# Patient Record
Sex: MALE | Race: WHITE | NOT HISPANIC OR LATINO | URBAN - METROPOLITAN AREA
[De-identification: names, ages, dates, MRNs, and addresses within clinical notes are randomized per-mention and may not be internally consistent; named-entity substitution may affect disease eponyms.]

---

## 2019-06-13 ENCOUNTER — EMERGENCY (EMERGENCY)
Facility: HOSPITAL | Age: 54
LOS: 1 days | Discharge: HOME | End: 2019-06-13
Attending: EMERGENCY MEDICINE
Payer: COMMERCIAL

## 2019-06-13 VITALS
OXYGEN SATURATION: 98 % | WEIGHT: 179.9 LBS | TEMPERATURE: 98 F | RESPIRATION RATE: 20 BRPM | DIASTOLIC BLOOD PRESSURE: 97 MMHG | HEIGHT: 70 IN | SYSTOLIC BLOOD PRESSURE: 138 MMHG | HEART RATE: 85 BPM

## 2019-06-13 PROCEDURE — 99284 EMERGENCY DEPT VISIT MOD MDM: CPT | Mod: 25

## 2019-06-13 PROCEDURE — 99284 EMERGENCY DEPT VISIT MOD MDM: CPT

## 2019-06-13 PROCEDURE — 93971 EXTREMITY STUDY: CPT

## 2019-06-13 PROCEDURE — 93971 EXTREMITY STUDY: CPT | Mod: 26,RT

## 2019-06-13 NOTE — ED STATDOCS - CARE PLAN
Principal Discharge DX:	Calf pain  Secondary Diagnosis:	Baker cyst, right Principal Discharge DX:	Calf pain  Secondary Diagnosis:	Baker cyst, right  Secondary Diagnosis:	Muscle strain

## 2019-06-13 NOTE — ED STATDOCS - NS ED ROS FT
Const: Denies fever, chills  HEENT: Denies blurry vision, sore throat. +decreased left ear hearing  Neck: Denies neck pain/stiffness  Resp: Denies SOB. +cough  Cardiovascular: Denies CP, palpitations, LE edema  GI: Denies nausea, vomiting, abdominal pain, diarrhea, constipation, blood in stool  : Denies urinary frequency/urgency/dysuria, hematuria  MSK: Denies back pain. +right calf pain   Neuro: Denies HA, dizziness, numbness, weakness  Skin: Denies rashes. Const: Denies fever, chills  HEENT: Denies blurry vision, sore throat. +decreased left ear hearing  Neck: Denies neck pain/stiffness  Resp: Denies SOB. +cough  Cardiovascular: Denies CP, palpitations, LE edema  GI: Denies nausea, vomiting, abdominal pain, diarrhea, constipation, blood in stool  : Denies urinary frequency/urgency/dysuria, hematuria  MSK: Denies back pain. +right calf pain , swellin  Neuro: Denies HA, dizziness, numbness, weakness  Skin: Denies rashes. Const: Denies fever, chills  HEENT: +decreased left ear hearing, + left dental pain. Denies blurry vision, sore throat.   Neck: Denies neck pain/stiffness  Resp: Denies SOB. +cough  Cardiovascular: Denies CP, palpitations  GI: Denies nausea, vomiting, abdominal pain, diarrhea, constipation, blood in stool  : Denies urinary frequency/urgency/dysuria, hematuria  MSK:  +right calf pain , swelling. Denies back pain.  Neuro: Denies HA, dizziness, numbness, weakness  Skin: Denies rashes.

## 2019-06-13 NOTE — ED STATDOCS - ATTENDING CONTRIBUTION TO CARE
I, Erin Valdez, performed the initial face to face bedside interview with this patient regarding history of present illness, review of symptoms and relevant past medical, social and family history.  I completed an independent physical examination.  I was the initial provider who evaluated this patient. I have signed out the follow up of any pending tests (i.e. labs, radiological studies) to the ACP.  I have communicated the patient’s plan of care and disposition with the ACP.

## 2019-06-13 NOTE — ED STATDOCS - OBJECTIVE STATEMENT
55 y/o male with no relevant PMHx presents to the ED c/o right calf pain x1 week. 1 week ago pt was moving a trampoline by dragging it behind him and felt like he "got shot in his right leg". Now pt has pain in his right calf with certain movements with RLE swelling. Pt has had seasonal allergies recently and yesterday had left jaw pain that resolved today. Today pt woke up with decreased hearing in left ear so went to urgent care and they sent pt to the ED regarding his right calf pain. Today pt had onset of cough. No fever, chills, chest pain, sore throat, difficulty breathing, numbness, blurry vision. Pt drives 1.5 hours daily for work. No anti-coagulant use. No recent travel. No recent surgery. Allergic to contrast. 53 y/o male with a PMHx of kidney stones presents to the ED c/o right calf pain x1 week. 1 week ago pt was moving a trampoline by dragging it behind him and felt like he "got shot in his right leg". Now pt has pain in his right calf with certain movements with RLE swelling. Pt has had seasonal allergies recently and yesterday had left jaw pain that resolved today. Today pt woke up with decreased hearing in left ear so went to urgent care and they sent pt to the ED regarding his right calf pain. Today pt had onset of cough. No fever, chills, chest pain, sore throat, difficulty breathing, numbness, blurry vision. Pt drives 1.5 hours daily for work. No anti-coagulant use. Takes Claritin daily. Last took Ibuprofen at 13:00 today. No recent travel. No recent surgery. Allergic to contrast. Feeding Assistance/Meals and Snack/Medical Food Supplements/Vitamin

## 2019-06-13 NOTE — ED STATDOCS - PROGRESS NOTE DETAILS
PA note: PT evaluated by intake physician. HPI/PE/ROS as noted above. Will follow up plan per intake physician. Sono negative for DVT. Results d/w patient. pt provided with copy of sono report and educated on RICE measures for comfort/pain relief. Pt also provided with f/u for ortho for further eval of bakers cyst. Pt encouraged to use ibuprofen/motrin as needed for pain.

## 2019-06-13 NOTE — ED STATDOCS - CLINICAL SUMMARY MEDICAL DECISION MAKING FREE TEXT BOX
Pt presents c/o 1 week of right lower leg pain after he felt a pop while pulling a heavy trampoline. Gradual onset of swelling. taking Ibuprofen for pain at home. Neurovascularly intact. TTP of the medial head of the gastroc. Likely muscle strain vs. DVT. Will sono to evaluate for DVT. Pt declines any pain medications at this time. Pt also with decreased hearing in left ear, no TM infection, cranial nerves intact. Pt declines workup for that at this time.

## 2019-06-13 NOTE — ED STATDOCS - PHYSICAL EXAMINATION
Const: Awake, alert and oriented. In no acute distress. Well appearing.  HEENT: NC/AT. Moist mucous membranes.  Eyes: No scleral icterus. EOMI.  Neck:. Soft and supple. Full ROM without pain.  Cardiac: Regular rate and regular rhythm. +S1/S2. No murmurs. Peripheral pulses 2+ and symmetric. No LE edema.  Resp: Speaking in full sentences. No evidence of respiratory distress. No wheezes, rales or rhonchi.  Abd: Soft, non-tender, non-distended. Normal bowel sounds in all 4 quadrants. No guarding or rebound.  Back: Spine midline and non-tender. No CVAT.  Skin: No rashes, abrasions or lacerations. +ecchymosis from the insertion of the right achilles up the medial aspect of the gastroc.   Lymph: No cervical lymphadenopathy.  Neuro: Awake, alert & oriented x 3. Moves all extremities symmetrically.   MSK: +TTP on the right calf over the medial head of the gastroc. No TTP of popliteal fossa. +edema from the right ankle up to above the knee. DP pulse, popliteal pulse bilaterally. Const: Awake, alert and oriented. In no acute distress. Well appearing.  HEENT: NC/AT. Moist mucous membranes. Bilateral TM's clear without erythema, bulging or purulence. Uvula midline, no posterior erythema, no tonsillar exudate or edema. No gingival erythema or abscess.  Eyes: No scleral icterus. EOMI.  Neck:. Soft and supple. Full ROM without pain.  Cardiac: Regular rate and regular rhythm. +S1/S2. No murmurs. Peripheral pulses 2+ and symmetric. Right LE edema.  Resp: Speaking in full sentences. No evidence of respiratory distress. No wheezes, rales or rhonchi.  Abd: Soft, non-tender, non-distended. Normal bowel sounds in all 4 quadrants. No guarding or rebound.  Back: Spine midline and non-tender. No CVAT.  Skin: No rashes, abrasions or lacerations. +ecchymosis from the insertion of the right achilles up the medial aspect of the gastroc.   Lymph: No cervical lymphadenopathy.  Neuro: Awake, alert & oriented x 3. CN II-XII symmetrically intact. Symmetrical smile, tongue midline, sensation symmetrically intact bilateral face. Moves all extremities symmetrically.   MSK: +TTP on the right calf over the medial head of the gastroc. No TTP of popliteal fossa. 2+ edema from the right ankle up to above the knee. DP pulse, popliteal pulse bilaterally.

## 2019-06-13 NOTE — ED STATDOCS - PMH
No pertinent past medical history <<----- Click to add NO pertinent Past Medical History Kidney stones

## 2020-02-17 ENCOUNTER — EMERGENCY (EMERGENCY)
Facility: HOSPITAL | Age: 55
LOS: 1 days | Discharge: LEFT BEFORE DISCHARGE | End: 2020-02-17
Attending: EMERGENCY MEDICINE | Admitting: EMERGENCY MEDICINE
Payer: COMMERCIAL

## 2020-02-17 VITALS
TEMPERATURE: 98 F | HEIGHT: 70 IN | RESPIRATION RATE: 18 BRPM | OXYGEN SATURATION: 99 % | DIASTOLIC BLOOD PRESSURE: 86 MMHG | HEART RATE: 97 BPM | SYSTOLIC BLOOD PRESSURE: 159 MMHG | WEIGHT: 175.05 LBS

## 2020-02-17 PROCEDURE — 99284 EMERGENCY DEPT VISIT MOD MDM: CPT

## 2020-02-17 PROCEDURE — 72040 X-RAY EXAM NECK SPINE 2-3 VW: CPT | Mod: 26

## 2020-02-17 PROCEDURE — 72100 X-RAY EXAM L-S SPINE 2/3 VWS: CPT | Mod: 26

## 2020-02-17 PROCEDURE — 71101 X-RAY EXAM UNILAT RIBS/CHEST: CPT | Mod: 26

## 2020-02-17 RX ORDER — IBUPROFEN 200 MG
600 TABLET ORAL ONCE
Refills: 0 | Status: COMPLETED | OUTPATIENT
Start: 2020-02-17 | End: 2020-02-17

## 2020-02-17 RX ADMIN — Medication 600 MILLIGRAM(S): at 22:05
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
## 2020-02-17 NOTE — ED STATDOCS - NSFOLLOWUPINSTRUCTIONS_ED_ALL_ED_FT
- Prescription sent to pharmacy.  - Please call to schedule follow up appointment with your primary care physician within 24-48 hours.  - Please seek immediate medical attention for any new/worsening, signs/symptoms, or concerns.    Feel better! - The patient is leaving AMA.  - Prescription sent to pharmacy.  - Please call to schedule follow up appointment with your primary care physician within 24-48 hours.  - Please seek immediate medical attention for any new/worsening, signs/symptoms, or concerns.    Feel better!

## 2020-02-17 NOTE — ED STATDOCS - PROGRESS NOTE DETAILS
NICK uDran: Patient evaluated by intake physician. HPI/ROS/PE as noted above. Will follow up plan per intake physician and continue to assess patient.  PCP: NICK Duran: Pt does not wish to wait, choosing to leave AMA. I had a lengthy discussion with the patient. The patient wishes to leave at this time. The patient verbalizes understanding that he/she is leaving against medical advice despite the risk of missing a potential serious diagnosis which may lead to injury, disability, and/or death. I discussed with the patient which tests may need to be performed. I was unable to convince the patient to stay for further work-up. The patient is alert and oriented and demonstrates competence in making medical decisions.

## 2020-02-17 NOTE — ED STATDOCS - ATTENDING CONTRIBUTION TO CARE
I, Monica Lara, performed the initial face to face bedside interview with this patient regarding history of present illness, review of symptoms and relevant past medical, social and family history.  I completed an independent physical examination.  I was the initial provider who evaluated this patient. I have signed out the follow up of any pending tests (i.e. labs, radiological studies) to the ACP.  I have communicated the patient’s plan of care and disposition with the ACP.  The history, relevant review of systems, past medical and surgical history, medical decision making, and physical examination was documented by the scribe in my presence and I attest to the accuracy of the documentation.
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
## 2020-02-17 NOTE — ED STATDOCS - PATIENT PORTAL LINK FT
You can access the FollowMyHealth Patient Portal offered by [facility] by registering at the following website: [email or web address]. By joining [employer]’s FollowMyHealth portal, you will also be able to view your health information using other applications (apps) compatible with our system.

## 2020-02-17 NOTE — ED ADULT TRIAGE NOTE - CHIEF COMPLAINT QUOTE
a dog attacked me I fell back I have pain lower back lt leg and lt leg pain and pins and needles  also lt side  rib area

## 2020-02-17 NOTE — ED STATDOCS - CARE PROVIDER_API CALL
Balwinder Maldonado (DO)  Orthopaedic Surgery  [address]  [place] [address]  Phone: [phone]  Fax: [phone]  Follow Up Time:

## 2020-02-17 NOTE — ED ADULT NURSE NOTE - EXPLANATION OF PATIENT'S REASON FOR LEAVING
wait to long will see MD in AM and ask for CT patient aware to return if symptoms become worse patient agreeable

## 2020-02-18 PROBLEM — N20.0 CALCULUS OF KIDNEY: Chronic | Status: ACTIVE | Noted: 2019-06-13

## 2020-02-18 PROCEDURE — 71101 X-RAY EXAM UNILAT RIBS/CHEST: CPT

## 2020-02-18 PROCEDURE — 99284 EMERGENCY DEPT VISIT MOD MDM: CPT

## 2020-02-18 PROCEDURE — 72100 X-RAY EXAM L-S SPINE 2/3 VWS: CPT

## 2020-02-18 PROCEDURE — 72040 X-RAY EXAM NECK SPINE 2-3 VW: CPT

## 2020-02-18 RX ORDER — LIDOCAINE 4 G/100G
1 CREAM TOPICAL ONCE
Refills: 0 | Status: COMPLETED | OUTPATIENT
Start: 2020-02-18 | End: 2020-02-18

## 2020-02-18 RX ORDER — CYCLOBENZAPRINE HYDROCHLORIDE 10 MG/1
1 TABLET, FILM COATED ORAL
Qty: 15 | Refills: 0
Start: 2020-02-18 | End: 2020-02-22

## 2020-02-18 RX ORDER — IBUPROFEN 200 MG
1 TABLET ORAL
Qty: 28 | Refills: 0
Start: 2020-02-18 | End: 2020-02-24

## 2020-02-18 RX ADMIN — LIDOCAINE 1 PATCH: 4 CREAM TOPICAL at 00:56

## 2021-03-12 ENCOUNTER — TRANSCRIPTION ENCOUNTER (OUTPATIENT)
Age: 56
End: 2021-03-12

## 2021-03-27 ENCOUNTER — EMERGENCY (EMERGENCY)
Facility: HOSPITAL | Age: 56
LOS: 1 days | Discharge: HOME | End: 2021-03-27
Attending: EMERGENCY MEDICINE
Payer: COMMERCIAL

## 2021-03-27 VITALS
RESPIRATION RATE: 20 BRPM | SYSTOLIC BLOOD PRESSURE: 137 MMHG | HEART RATE: 92 BPM | WEIGHT: 186.07 LBS | TEMPERATURE: 99 F | OXYGEN SATURATION: 99 % | HEIGHT: 70 IN | DIASTOLIC BLOOD PRESSURE: 87 MMHG

## 2021-03-27 LAB
ALBUMIN SERPL ELPH-MCNC: 3.8 G/DL — SIGNIFICANT CHANGE UP (ref 3.3–5.2)
ALP SERPL-CCNC: 72 U/L — SIGNIFICANT CHANGE UP (ref 40–120)
ALT FLD-CCNC: 22 U/L — SIGNIFICANT CHANGE UP
ANION GAP SERPL CALC-SCNC: 13 MMOL/L — SIGNIFICANT CHANGE UP (ref 5–17)
APTT BLD: 24.3 SEC — LOW (ref 27.5–35.5)
AST SERPL-CCNC: 15 U/L — SIGNIFICANT CHANGE UP
BASOPHILS # BLD AUTO: 0.03 K/UL — SIGNIFICANT CHANGE UP (ref 0–0.2)
BASOPHILS NFR BLD AUTO: 0.3 % — SIGNIFICANT CHANGE UP (ref 0–2)
BILIRUB SERPL-MCNC: 0.6 MG/DL — SIGNIFICANT CHANGE UP (ref 0.4–2)
BUN SERPL-MCNC: 18 MG/DL — SIGNIFICANT CHANGE UP (ref 8–20)
CALCIUM SERPL-MCNC: 9.3 MG/DL — SIGNIFICANT CHANGE UP (ref 8.6–10.2)
CHLORIDE SERPL-SCNC: 100 MMOL/L — SIGNIFICANT CHANGE UP (ref 98–107)
CO2 SERPL-SCNC: 23 MMOL/L — SIGNIFICANT CHANGE UP (ref 22–29)
CREAT SERPL-MCNC: 0.83 MG/DL — SIGNIFICANT CHANGE UP (ref 0.5–1.3)
CRP SERPL-MCNC: <4 MG/L — SIGNIFICANT CHANGE UP
D DIMER BLD IA.RAPID-MCNC: <150 NG/ML DDU — SIGNIFICANT CHANGE UP
EOSINOPHIL # BLD AUTO: 0.03 K/UL — SIGNIFICANT CHANGE UP (ref 0–0.5)
EOSINOPHIL NFR BLD AUTO: 0.3 % — SIGNIFICANT CHANGE UP (ref 0–6)
FERRITIN SERPL-MCNC: 113 NG/ML — SIGNIFICANT CHANGE UP (ref 30–400)
GLUCOSE SERPL-MCNC: 94 MG/DL — SIGNIFICANT CHANGE UP (ref 70–99)
HCT VFR BLD CALC: 46.3 % — SIGNIFICANT CHANGE UP (ref 39–50)
HGB BLD-MCNC: 15.1 G/DL — SIGNIFICANT CHANGE UP (ref 13–17)
IMM GRANULOCYTES NFR BLD AUTO: 0.9 % — SIGNIFICANT CHANGE UP (ref 0–1.5)
INR BLD: 1.05 RATIO — SIGNIFICANT CHANGE UP (ref 0.88–1.16)
LYMPHOCYTES # BLD AUTO: 18.8 % — SIGNIFICANT CHANGE UP (ref 13–44)
LYMPHOCYTES # BLD AUTO: 2.1 K/UL — SIGNIFICANT CHANGE UP (ref 1–3.3)
MCHC RBC-ENTMCNC: 27.4 PG — SIGNIFICANT CHANGE UP (ref 27–34)
MCHC RBC-ENTMCNC: 32.6 GM/DL — SIGNIFICANT CHANGE UP (ref 32–36)
MCV RBC AUTO: 83.9 FL — SIGNIFICANT CHANGE UP (ref 80–100)
MONOCYTES # BLD AUTO: 0.67 K/UL — SIGNIFICANT CHANGE UP (ref 0–0.9)
MONOCYTES NFR BLD AUTO: 6 % — SIGNIFICANT CHANGE UP (ref 2–14)
NEUTROPHILS # BLD AUTO: 8.22 K/UL — HIGH (ref 1.8–7.4)
NEUTROPHILS NFR BLD AUTO: 73.7 % — SIGNIFICANT CHANGE UP (ref 43–77)
PLATELET # BLD AUTO: 247 K/UL — SIGNIFICANT CHANGE UP (ref 150–400)
POTASSIUM SERPL-MCNC: 4.2 MMOL/L — SIGNIFICANT CHANGE UP (ref 3.5–5.3)
POTASSIUM SERPL-SCNC: 4.2 MMOL/L — SIGNIFICANT CHANGE UP (ref 3.5–5.3)
PROCALCITONIN SERPL-MCNC: 0.04 NG/ML — SIGNIFICANT CHANGE UP (ref 0.02–0.1)
PROT SERPL-MCNC: 6.8 G/DL — SIGNIFICANT CHANGE UP (ref 6.6–8.7)
PROTHROM AB SERPL-ACNC: 12.1 SEC — SIGNIFICANT CHANGE UP (ref 10.6–13.6)
RBC # BLD: 5.52 M/UL — SIGNIFICANT CHANGE UP (ref 4.2–5.8)
RBC # FLD: 16.1 % — HIGH (ref 10.3–14.5)
SODIUM SERPL-SCNC: 136 MMOL/L — SIGNIFICANT CHANGE UP (ref 135–145)
WBC # BLD: 11.15 K/UL — HIGH (ref 3.8–10.5)
WBC # FLD AUTO: 11.15 K/UL — HIGH (ref 3.8–10.5)

## 2021-03-27 PROCEDURE — 86140 C-REACTIVE PROTEIN: CPT

## 2021-03-27 PROCEDURE — 93005 ELECTROCARDIOGRAM TRACING: CPT

## 2021-03-27 PROCEDURE — 94640 AIRWAY INHALATION TREATMENT: CPT

## 2021-03-27 PROCEDURE — 84145 PROCALCITONIN (PCT): CPT

## 2021-03-27 PROCEDURE — 85730 THROMBOPLASTIN TIME PARTIAL: CPT

## 2021-03-27 PROCEDURE — U0003: CPT

## 2021-03-27 PROCEDURE — 80053 COMPREHEN METABOLIC PANEL: CPT

## 2021-03-27 PROCEDURE — U0005: CPT

## 2021-03-27 PROCEDURE — 96365 THER/PROPH/DIAG IV INF INIT: CPT

## 2021-03-27 PROCEDURE — 96375 TX/PRO/DX INJ NEW DRUG ADDON: CPT

## 2021-03-27 PROCEDURE — 85610 PROTHROMBIN TIME: CPT

## 2021-03-27 PROCEDURE — 99284 EMERGENCY DEPT VISIT MOD MDM: CPT | Mod: 25

## 2021-03-27 PROCEDURE — 82728 ASSAY OF FERRITIN: CPT

## 2021-03-27 PROCEDURE — 85025 COMPLETE CBC W/AUTO DIFF WBC: CPT

## 2021-03-27 PROCEDURE — 71046 X-RAY EXAM CHEST 2 VIEWS: CPT

## 2021-03-27 PROCEDURE — 36415 COLL VENOUS BLD VENIPUNCTURE: CPT

## 2021-03-27 PROCEDURE — 71046 X-RAY EXAM CHEST 2 VIEWS: CPT | Mod: 26

## 2021-03-27 PROCEDURE — 87631 RESP VIRUS 3-5 TARGETS: CPT

## 2021-03-27 PROCEDURE — 99285 EMERGENCY DEPT VISIT HI MDM: CPT

## 2021-03-27 PROCEDURE — 93010 ELECTROCARDIOGRAM REPORT: CPT

## 2021-03-27 PROCEDURE — 85379 FIBRIN DEGRADATION QUANT: CPT

## 2021-03-27 RX ORDER — IPRATROPIUM/ALBUTEROL SULFATE 18-103MCG
3 AEROSOL WITH ADAPTER (GRAM) INHALATION ONCE
Refills: 0 | Status: COMPLETED | OUTPATIENT
Start: 2021-03-27 | End: 2021-03-27

## 2021-03-27 RX ORDER — DIPHENHYDRAMINE HCL 50 MG
50 CAPSULE ORAL ONCE
Refills: 0 | Status: COMPLETED | OUTPATIENT
Start: 2021-03-27 | End: 2021-03-27

## 2021-03-27 RX ORDER — MAGNESIUM SULFATE 500 MG/ML
2 VIAL (ML) INJECTION ONCE
Refills: 0 | Status: COMPLETED | OUTPATIENT
Start: 2021-03-27 | End: 2021-03-27

## 2021-03-27 RX ADMIN — Medication 50 GRAM(S): at 14:38

## 2021-03-27 RX ADMIN — Medication 3 MILLILITER(S): at 15:31

## 2021-03-27 RX ADMIN — Medication 2 GRAM(S): at 15:38

## 2021-03-27 RX ADMIN — Medication 125 MILLIGRAM(S): at 14:38

## 2021-03-27 RX ADMIN — Medication 50 MILLIGRAM(S): at 14:38

## 2021-03-27 NOTE — ED STATDOCS - ATTENDING CONTRIBUTION TO CARE
I, Joe Gonzalez, performed the initial face to face bedside interview with this patient regarding history of present illness, review of symptoms and relevant past medical, social and family history.  I completed an independent physical examination.  I was the initial provider who evaluated this patient. I have signed out the follow up of any pending tests (i.e. labs, radiological studies) to the ACP.  I have communicated the patient’s plan of care and disposition with the ACP.

## 2021-03-27 NOTE — ED ADULT TRIAGE NOTE - CHIEF COMPLAINT QUOTE
Pt arrives to ED c/o cough , pt states " I think I have allergic reaction to my second COVID vaccine given on  MArch 10 th " pt states since then  he has been coughing , was seen in [facility] ED  on March 19th and was placed on steroids, March 24th was seen in Urgent Care and placed again on steroids and antibiotics without improvement , also had negative COVID test on March 19th. Denies chest pain

## 2021-03-27 NOTE — ED STATDOCS - CARE PROVIDER_API CALL
Phil Moreira)  Internal Medicine; Pulmonary Disease  [facility] at [facility], [address] [place] [address], [address]  [place] [address]  Phone: [phone]  Fax: [phone]  Follow Up Time:

## 2021-03-27 NOTE — ED STATDOCS - OBJECTIVE STATEMENT
55 y/o M pt with significant PMHx of kidney stone and asthma presents to the ED c/o cough for 2 weeks with associated SOB. Pt was told it may be a reaction to the 2nd dose of the COVID vaccine which he received 03/10. Pt states that on 03/12 he went to , where he received Symbicort and a nebulizer. Pt states on 03/19 he was seen at [facility] ED where he obtained a CXR, a COVID negative test, and more steroids. Pt states his coughing has not subsided with the medications, Pt states that 3 days ago, he was seen by his PMD where he was given more doses of steroids and ABx.     Denies sore throat

## 2021-03-27 NOTE — ED STATDOCS - PMH
We give one refill. If continues to have symptoms after refill need to be seen for further eval.  Kidney stones

## 2021-03-27 NOTE — ED ADULT TRIAGE NOTE - IDEAL BODY WEIGHT(KG)
73 Cephalexin Counseling: I counseled the patient regarding use of cephalexin as an antibiotic for prophylactic and/or therapeutic purposes. Cephalexin (commonly prescribed under brand name Keflex) is a cephalosporin antibiotic which is active against numerous classes of bacteria, including most skin bacteria. Side effects may include nausea, diarrhea, gastrointestinal upset, rash, hives, yeast infections, and in rare cases, hepatitis, kidney disease, seizures, fever, confusion, neurologic symptoms, and others. Patients with severe allergies to penicillin medications are cautioned that there is about a 10% incidence of cross-reactivity with cephalosporins. When possible, patients with penicillin allergies should use alternatives to cephalosporins for antibiotic therapy.

## 2021-03-27 NOTE — ED STATDOCS - PATIENT PORTAL LINK FT
You can access the FollowMyHealth Patient Portal offered by [facility] by registering at the following website: [email or web address]. By joining [employer]’s FollowMyHealth portal, you will also be able to view your health information using other applications (apps) compatible with our system.

## 2021-03-27 NOTE — ED STATDOCS - PROGRESS NOTE DETAILS
Pt reports significant relief of presenting symptoms. Pt states that he already has cough medicine at home and is in the middle of a abx/steroid course, and does not need any medication for home. Pts labs otherwise wnl, flu negative. Pt stable for d/c with pulmonology f/u as need.

## 2021-04-07 ENCOUNTER — APPOINTMENT (OUTPATIENT)
Dept: PULMONOLOGY | Facility: CLINIC | Age: 56
End: 2021-04-07
Payer: COMMERCIAL

## 2021-04-07 VITALS
DIASTOLIC BLOOD PRESSURE: 92 MMHG | BODY MASS INDEX: 29.19 KG/M2 | HEIGHT: 67 IN | HEART RATE: 77 BPM | WEIGHT: 186 LBS | SYSTOLIC BLOOD PRESSURE: 140 MMHG | OXYGEN SATURATION: 98 %

## 2021-04-07 VITALS — RESPIRATION RATE: 16 BRPM

## 2021-04-07 DIAGNOSIS — Z82.5 FAMILY HISTORY OF ASTHMA AND OTHER CHRONIC LOWER RESPIRATORY DISEASES: ICD-10-CM

## 2021-04-07 DIAGNOSIS — Z23 ENCOUNTER FOR IMMUNIZATION: ICD-10-CM

## 2021-04-07 PROCEDURE — 99072 ADDL SUPL MATRL&STAF TM PHE: CPT

## 2021-04-07 PROCEDURE — 99204 OFFICE O/P NEW MOD 45 MIN: CPT

## 2021-04-07 RX ORDER — ALBUTEROL SULFATE 2.5 MG/3ML
(2.5 MG/3ML) SOLUTION RESPIRATORY (INHALATION)
Refills: 0 | Status: ACTIVE | COMMUNITY

## 2021-04-07 RX ORDER — ALBUTEROL SULFATE 90 UG/1
108 AEROSOL, METERED RESPIRATORY (INHALATION)
Refills: 0 | Status: ACTIVE | COMMUNITY

## 2021-04-07 NOTE — CONSULT LETTER
[Dear  ___] : Dear  [unfilled], [Consult Letter:] : I had the pleasure of evaluating your patient, [unfilled]. [Please see my note below.] : Please see my note below. [Consult Closing:] : Thank you very much for allowing me to participate in the care of this patient.  If you have any questions, please do not hesitate to contact me. [Sincerely,] : Sincerely, [FreeTextEntry3] : Yolsi Au MD FCCP\par D-ABSM\par ABIM board certified in  Pulmonary diseases, Sleep medicine\par Internal medicine\par

## 2021-04-07 NOTE — PHYSICAL EXAM
[No Acute Distress] : no acute distress [Normal Oropharynx] : normal oropharynx [II] : Mallampati Class: II [Normal Appearance] : normal appearance [No Neck Mass] : no neck mass [Normal Rate/Rhythm] : normal rate/rhythm [Normal S1, S2] : normal s1, s2 [No Murmurs] : no murmurs [No Resp Distress] : no resp distress [No Abnormalities] : no abnormalities [Clear to Auscultation Bilaterally] : clear to auscultation bilaterally [Benign] : benign [Normal Gait] : normal gait [No Clubbing] : no clubbing [No Cyanosis] : no cyanosis [No Edema] : no edema [FROM] : FROM [Normal Color/ Pigmentation] : normal color/ pigmentation [No Focal Deficits] : no focal deficits [Oriented x3] : oriented x3 [Normal Affect] : normal affect

## 2021-04-07 NOTE — ASSESSMENT
[FreeTextEntry1] : It is unclear what is happening here. The patient may have had some reaction to the vaccine leading to some bronchospasm. It seems to have resolved with steroids and bronchodilators. It is possible that his asthma is worse than he thinks. Pulmonary function studies have been ordered to look for that.\par \par The oral pain sounds neuropathic to me. I have seen neuropathic dental pain associated with covid, and it may be possible that the immune reaction to the vaccine could cause the same thing. We will try gabapentin 100 mg t.i.d. to see if this helps.\par \par I will see the patient back in about 3 weeks to review his response to gabapentin and his pulmonary function studies.

## 2021-04-07 NOTE — HISTORY OF PRESENT ILLNESS
[TextBox_4] : The patient is a 56-year-old male who has a history of mild intermittent asthma with rare rescue inhaler use. Approximately 4 weeks ago he had his second vaccination for covid 19. 2 days later he developed shortness of breath along with cough. He was seen in urgent care and given a nebulizer which helped slightly. 2 weeks ago he developed ongoing shortness of breath and cough with wheeze. He was seen in the [facility] emergency room and given nebulizers and IV steroids which helped. He was sent home on a tapering course of prednisone along with Symbicort and albuterol. He started to feel better with that and currently is using no medications. Concomitant with that he's developed left jaw pain and tooth pain. He was seen by his dentist who didn't find anything and then by an oral surgeon who did not find anything. He is on antibiotics currently for that. The discomfort is still there.

## 2021-05-15 ENCOUNTER — APPOINTMENT (OUTPATIENT)
Dept: DISASTER EMERGENCY | Facility: CLINIC | Age: 56
End: 2021-05-15

## 2021-05-15 ENCOUNTER — LABORATORY RESULT (OUTPATIENT)
Age: 56
End: 2021-05-15

## 2021-05-18 ENCOUNTER — APPOINTMENT (OUTPATIENT)
Dept: PULMONOLOGY | Facility: CLINIC | Age: 56
End: 2021-05-18
Payer: COMMERCIAL

## 2021-05-18 VITALS — HEIGHT: 68 IN | WEIGHT: 184 LBS | BODY MASS INDEX: 27.89 KG/M2 | TEMPERATURE: 98.1 F

## 2021-05-18 VITALS — DIASTOLIC BLOOD PRESSURE: 80 MMHG | SYSTOLIC BLOOD PRESSURE: 130 MMHG | HEART RATE: 74 BPM | OXYGEN SATURATION: 98 %

## 2021-05-18 DIAGNOSIS — G89.29 NEURALGIA AND NEURITIS, UNSPECIFIED: ICD-10-CM

## 2021-05-18 DIAGNOSIS — J45.909 UNSPECIFIED ASTHMA, UNCOMPLICATED: ICD-10-CM

## 2021-05-18 DIAGNOSIS — M79.2 NEURALGIA AND NEURITIS, UNSPECIFIED: ICD-10-CM

## 2021-05-18 PROCEDURE — 85018 HEMOGLOBIN: CPT | Mod: QW

## 2021-05-18 PROCEDURE — 94729 DIFFUSING CAPACITY: CPT

## 2021-05-18 PROCEDURE — 94727 GAS DIL/WSHOT DETER LNG VOL: CPT

## 2021-05-18 PROCEDURE — 99214 OFFICE O/P EST MOD 30 MIN: CPT | Mod: 25

## 2021-05-18 PROCEDURE — 94010 BREATHING CAPACITY TEST: CPT

## 2021-05-18 PROCEDURE — 99072 ADDL SUPL MATRL&STAF TM PHE: CPT

## 2021-05-18 RX ORDER — CEFDINIR 300 MG/1
300 CAPSULE ORAL
Qty: 20 | Refills: 0 | Status: DISCONTINUED | COMMUNITY
Start: 2021-03-24

## 2021-05-18 RX ORDER — HYDROCODONE BITARTRATE AND ACETAMINOPHEN 5; 325 MG/1; MG/1
5-325 TABLET ORAL
Qty: 12 | Refills: 0 | Status: DISCONTINUED | COMMUNITY
Start: 2021-04-08

## 2021-05-18 RX ORDER — IBUPROFEN 600 MG/1
600 TABLET, FILM COATED ORAL
Qty: 12 | Refills: 0 | Status: DISCONTINUED | COMMUNITY
Start: 2021-04-06

## 2021-05-18 RX ORDER — BENZONATATE 200 MG/1
200 CAPSULE ORAL
Qty: 42 | Refills: 0 | Status: DISCONTINUED | COMMUNITY
Start: 2021-03-24

## 2021-05-18 RX ORDER — AMOXICILLIN 875 MG/1
875 TABLET, FILM COATED ORAL
Qty: 14 | Refills: 0 | Status: DISCONTINUED | COMMUNITY
Start: 2021-04-12

## 2021-05-18 RX ORDER — BUDESONIDE AND FORMOTEROL FUMARATE DIHYDRATE 160; 4.5 UG/1; UG/1
160-4.5 AEROSOL RESPIRATORY (INHALATION)
Qty: 10 | Refills: 0 | Status: ACTIVE | COMMUNITY
Start: 2021-03-12

## 2021-05-18 RX ORDER — METHYLPREDNISOLONE 4 MG/1
4 TABLET ORAL
Qty: 21 | Refills: 0 | Status: DISCONTINUED | COMMUNITY
Start: 2021-03-24

## 2021-05-18 NOTE — HISTORY OF PRESENT ILLNESS
[TextBox_4] : Patient came in for pulmonary function studies today. Gabapentin did help his dental pain but he was ultimately found to require root canal. That solved the problem permanently. He denies any shortness of breath. He has had rare use of albuterol inhaler.

## 2021-05-18 NOTE — ASSESSMENT
[FreeTextEntry1] : Patient has mild intermittent asthma doing well. He is status post covid infection without any residual problems. Dental pain has resolved. Follow up here p.r.n.

## 2022-06-28 NOTE — ED ADULT NURSE NOTE - NS ED NURSE DISCH DISPOSITION
spontaneous/unlabored AMA (saw a physician/midlevel provider and clinician was able to provide reasons for staying for treatment & form is signed)

## 2024-01-04 ENCOUNTER — OUTPATIENT (OUTPATIENT)
Dept: OUTPATIENT SERVICES | Facility: HOSPITAL | Age: 59
LOS: 1 days | End: 2024-01-04

## 2024-01-04 VITALS
HEIGHT: 69 IN | DIASTOLIC BLOOD PRESSURE: 85 MMHG | OXYGEN SATURATION: 98 % | RESPIRATION RATE: 18 BRPM | SYSTOLIC BLOOD PRESSURE: 131 MMHG | HEART RATE: 95 BPM | WEIGHT: 175.05 LBS | TEMPERATURE: 98 F

## 2024-01-04 DIAGNOSIS — J45.909 UNSPECIFIED ASTHMA, UNCOMPLICATED: ICD-10-CM

## 2024-01-04 DIAGNOSIS — N20.0 CALCULUS OF KIDNEY: ICD-10-CM

## 2024-01-04 DIAGNOSIS — Z98.890 OTHER SPECIFIED POSTPROCEDURAL STATES: Chronic | ICD-10-CM

## 2024-01-04 LAB
BLD GP AB SCN SERPL QL: NEGATIVE — SIGNIFICANT CHANGE UP
BLD GP AB SCN SERPL QL: NEGATIVE — SIGNIFICANT CHANGE UP
RH IG SCN BLD-IMP: POSITIVE — SIGNIFICANT CHANGE UP
RH IG SCN BLD-IMP: POSITIVE — SIGNIFICANT CHANGE UP

## 2024-01-04 RX ORDER — SODIUM CHLORIDE 9 MG/ML
1000 INJECTION, SOLUTION INTRAVENOUS
Refills: 0 | Status: DISCONTINUED | OUTPATIENT
Start: 2024-01-09 | End: 2024-01-23

## 2024-01-04 NOTE — H&P PST ADULT - PROBLEM SELECTOR PLAN 1
Patient tentatively scheduled for  Left percutaneous nephrolithotomy on 1/9/24.   Pre-op instructions provided. Pt given verbal and written instructions with teach back on chlorhexidine shampoo and pepcid. Pt verbalized understanding with return demonstration.     Copy of labs CBC, CMP, Ua, EKG in chart.  Urine culture was done at PST.   Copy of Medical evaluation in chart.

## 2024-01-04 NOTE — H&P PST ADULT - GASTROINTESTINAL
normal/soft/nontender/nondistended/normal active bowel sounds/no guarding/no rigidity/no palpable han details…

## 2024-01-04 NOTE — H&P PST ADULT - PRIMARY CARE PROVIDER
Dr. Guy Balbuena- [phone]        Fax [phone] Dr. Guy Balbuena- [phone]        Fax [phone] Dr. Guy Balbuena- [phone]             Fax [phone] Dr. Guy Balbuena- [phone]             Fax [phone]

## 2024-01-04 NOTE — H&P PST ADULT - MUSCULOSKELETAL
normal/ROM intact/no joint swelling/no joint erythema/no joint warmth/no calf tenderness/normal gait/strength 5/5 bilateral upper extremities/strength 5/5 bilateral lower extremities details… normal/ROM intact/no joint swelling/no joint erythema/no joint warmth/no calf tenderness/normal gait/strength 5/5 bilateral upper extremities/strength 5/5 bilateral lower extremities/back exam

## 2024-01-04 NOTE — H&P PST ADULT - NEGATIVE GENERAL GENITOURINARY SYMPTOMS
no hematuria/no flank pain R/no urine discoloration/no incontinence/no dysuria/no urinary hesitancy/normal urinary frequency

## 2024-01-04 NOTE — H&P PST ADULT - HISTORY OF PRESENT ILLNESS
58 year old male with pmhx of Asthma, Kidney stones s/p Lithotripsy (15 years ago and 8 years ago), presents for pre-op evaluation for diagnosis of Calculus of kidney. Patient is scheduled for Left percutaneous nephrolithotomy. Patient c/o b/l flank pain. Denies gross hematuria, dysuria.

## 2024-01-04 NOTE — H&P PST ADULT - NSANTHOSAYNRD_GEN_A_CORE
No. PETRA screening performed.  STOP BANG Legend: 0-2 = LOW Risk; 3-4 = INTERMEDIATE Risk; 5-8 = HIGH Risk

## 2024-01-04 NOTE — H&P PST ADULT - NEGATIVE ENMT SYMPTOMS
no hearing difficulty/no ear pain/no tinnitus/no vertigo/no sinus symptoms/no nasal congestion/no nasal discharge/no dry mouth/no throat pain/no dysphagia

## 2024-01-05 LAB
CULTURE RESULTS: SIGNIFICANT CHANGE UP
CULTURE RESULTS: SIGNIFICANT CHANGE UP
RH IG SCN BLD-IMP: POSITIVE — SIGNIFICANT CHANGE UP
RH IG SCN BLD-IMP: POSITIVE — SIGNIFICANT CHANGE UP
SPECIMEN SOURCE: SIGNIFICANT CHANGE UP
SPECIMEN SOURCE: SIGNIFICANT CHANGE UP

## 2024-01-08 ENCOUNTER — TRANSCRIPTION ENCOUNTER (OUTPATIENT)
Age: 59
End: 2024-01-08

## 2024-01-08 NOTE — ASU PATIENT PROFILE, ADULT - FALL HARM RISK - UNIVERSAL INTERVENTIONS
Bed in lowest position, wheels locked, appropriate side rails in place/Call bell, personal items and telephone in reach/Instruct patient to call for assistance before getting out of bed or chair/Non-slip footwear when patient is out of bed/[place] to call system/Physically safe environment - no spills, clutter or unnecessary equipment/Purposeful Proactive Rounding/Room/bathroom lighting operational, light cord in reach Bed in lowest position, wheels locked, appropriate side rails in place/Call bell, personal items and telephone in reach/Instruct patient to call for assistance before getting out of bed or chair/Non-slip footwear when patient is out of bed/[place] to call system/Physically safe environment - no spills, clutter or unnecessary equipment/Purposeful Proactive Rounding/Room/bathroom lighting operational, light cord in reach

## 2024-01-09 ENCOUNTER — TRANSCRIPTION ENCOUNTER (OUTPATIENT)
Age: 59
End: 2024-01-09

## 2024-01-09 ENCOUNTER — OUTPATIENT (OUTPATIENT)
Dept: OUTPATIENT SERVICES | Facility: HOSPITAL | Age: 59
LOS: 1 days | Discharge: HOME | End: 2024-01-09

## 2024-01-09 VITALS
WEIGHT: 175.05 LBS | SYSTOLIC BLOOD PRESSURE: 132 MMHG | HEART RATE: 69 BPM | TEMPERATURE: 98 F | HEIGHT: 69 IN | OXYGEN SATURATION: 98 % | RESPIRATION RATE: 16 BRPM | DIASTOLIC BLOOD PRESSURE: 84 MMHG

## 2024-01-09 VITALS
SYSTOLIC BLOOD PRESSURE: 122 MMHG | HEART RATE: 88 BPM | OXYGEN SATURATION: 100 % | DIASTOLIC BLOOD PRESSURE: 69 MMHG | RESPIRATION RATE: 15 BRPM

## 2024-01-09 DIAGNOSIS — Z98.890 OTHER SPECIFIED POSTPROCEDURAL STATES: Chronic | ICD-10-CM

## 2024-01-09 DIAGNOSIS — N20.0 CALCULUS OF KIDNEY: ICD-10-CM

## 2024-01-09 DEVICE — SURGIFLO MATRIX WITH THROMBIN KIT: Type: IMPLANTABLE DEVICE | Status: FUNCTIONAL

## 2024-01-09 DEVICE — TRILOGY PROBE KIT 3.9MM X 440MM (BLUE): Type: IMPLANTABLE DEVICE | Status: FUNCTIONAL

## 2024-01-09 DEVICE — URETERAL CATH AXXCESS OPEN END 6FR 70CM: Type: IMPLANTABLE DEVICE | Status: FUNCTIONAL

## 2024-01-09 DEVICE — DILATOR SHEATH SET 8/10: Type: IMPLANTABLE DEVICE | Status: FUNCTIONAL

## 2024-01-09 DEVICE — URETERAL STENT PERCUFLEX PLUS 6FR 26CM: Type: IMPLANTABLE DEVICE | Status: FUNCTIONAL

## 2024-01-09 DEVICE — NEPHROSTOMY BALLOON CATH NEPHROMAX 24FR 17CM PTFE: Type: IMPLANTABLE DEVICE | Status: FUNCTIONAL

## 2024-01-09 DEVICE — GUIDEWIRE SENSOR DUAL-FLEX NITINOL STRAIGHT .035" X 150CM: Type: IMPLANTABLE DEVICE | Status: FUNCTIONAL

## 2024-01-09 RX ORDER — METOCLOPRAMIDE HCL 10 MG
10 TABLET ORAL ONCE
Refills: 0 | Status: COMPLETED | OUTPATIENT
Start: 2024-01-09 | End: 2024-01-09

## 2024-01-09 RX ORDER — ACETAMINOPHEN 500 MG
1000 TABLET ORAL ONCE
Refills: 0 | Status: COMPLETED | OUTPATIENT
Start: 2024-01-09 | End: 2024-01-09

## 2024-01-09 RX ORDER — HYDROMORPHONE HYDROCHLORIDE 2 MG/ML
0.5 INJECTION INTRAMUSCULAR; INTRAVENOUS; SUBCUTANEOUS
Refills: 0 | Status: DISCONTINUED | OUTPATIENT
Start: 2024-01-09 | End: 2024-01-09

## 2024-01-09 RX ORDER — HYDROMORPHONE HYDROCHLORIDE 2 MG/ML
1 INJECTION INTRAMUSCULAR; INTRAVENOUS; SUBCUTANEOUS
Refills: 0 | Status: DISCONTINUED | OUTPATIENT
Start: 2024-01-09 | End: 2024-01-09

## 2024-01-09 RX ORDER — ONDANSETRON 8 MG/1
4 TABLET, FILM COATED ORAL ONCE
Refills: 0 | Status: COMPLETED | OUTPATIENT
Start: 2024-01-09 | End: 2024-01-09

## 2024-01-09 RX ORDER — ALBUTEROL 90 UG/1
2 AEROSOL, METERED ORAL
Refills: 0 | DISCHARGE

## 2024-01-09 RX ORDER — OXYCODONE HYDROCHLORIDE 5 MG/1
5 TABLET ORAL ONCE
Refills: 0 | Status: COMPLETED | OUTPATIENT
Start: 2024-01-09 | End: 2024-01-09

## 2024-01-09 RX ADMIN — ONDANSETRON 4 MILLIGRAM(S): 8 TABLET, FILM COATED ORAL at 11:10

## 2024-01-09 RX ADMIN — ONDANSETRON 4 MILLIGRAM(S): 8 TABLET, FILM COATED ORAL at 10:46

## 2024-01-09 RX ADMIN — HYDROMORPHONE HYDROCHLORIDE 0.5 MILLIGRAM(S): 2 INJECTION INTRAMUSCULAR; INTRAVENOUS; SUBCUTANEOUS at 10:20

## 2024-01-09 RX ADMIN — Medication 10 MILLIGRAM(S): at 12:38

## 2024-01-09 RX ADMIN — HYDROMORPHONE HYDROCHLORIDE 0.5 MILLIGRAM(S): 2 INJECTION INTRAMUSCULAR; INTRAVENOUS; SUBCUTANEOUS at 10:35

## 2024-01-09 RX ADMIN — Medication 1000 MILLIGRAM(S): at 11:40

## 2024-01-09 RX ADMIN — Medication 400 MILLIGRAM(S): at 11:30

## 2024-01-09 RX ADMIN — HYDROMORPHONE HYDROCHLORIDE 0.5 MILLIGRAM(S): 2 INJECTION INTRAMUSCULAR; INTRAVENOUS; SUBCUTANEOUS at 11:00

## 2024-01-09 RX ADMIN — HYDROMORPHONE HYDROCHLORIDE 0.5 MILLIGRAM(S): 2 INJECTION INTRAMUSCULAR; INTRAVENOUS; SUBCUTANEOUS at 10:48

## 2024-01-09 NOTE — ASU DISCHARGE PLAN (ADULT/PEDIATRIC) - ASU DC SPECIAL INSTRUCTIONSFT
-It is common to have blood in the urine after your surgery.  It may be pink or even red; inform your doctor if you have a significant amount of clots in the urine or if you are unable to void at all.  Keep yourself well hydrated at all times.  -If you begin to leak a significant amount of fluid (or blood) from your back, call your urologist.  -You may shower, keep you may change the dressing over your incision after showering.    -You will be given a prescription for pain medication; continue using this as long as your pain persists.  As soon as Extra Strength Tylenol is adequate, you can switch to this instead – it is less constipating.  -You will have a prescription for an antibiotic when you go home.  Take these medications as instructed; if you miss one dose, resume taking them on your previous schedule until you have completed the entire course of treatment.  -Do not lift greater than 10lbs, drive or perform strenuous activities until your are told to resume this activity by your doctor.   You may climb stairs and you may resume sexual activity – be careful not to dislodge your tube if it is still in place.  -Call your physician if you have a fever over 101F.  -Make a follow up appointment with your urologist for the week after discharge. Please follow-up with Dr. Tovar in 2 weeks for stent removal.   -Call your urologist during normal business hours with any other routine questions.

## 2024-01-09 NOTE — ASU DISCHARGE PLAN (ADULT/PEDIATRIC) - FOLLOW UP APPOINTMENTS
may also call Recovery Room (PACU) 24/7 @ [phone]/[facility] may also call Recovery Room (PACU) 24/7 @ [phone]/[facility]

## 2024-01-09 NOTE — BRIEF OPERATIVE NOTE - NSICDXBRIEFPROCEDURE_GEN_ALL_CORE_FT
PROCEDURES:  Cystoscopy with percutaneous nephrolithotomy, left 09-Jan-2024 10:26:53  Tanner Javier

## 2024-01-09 NOTE — ASU DISCHARGE PLAN (ADULT/PEDIATRIC) - NS MD DC FALL RISK RISK
For information on Fall & Injury Prevention, visit: [email or web address] OR  [email or web address] OR  https://www.cdc.gov/steadi/patient.html For information on Fall & Injury Prevention, visit: [email or web address]://www.[email or web address]/[email or web address] OR  [email or web address]://www.[email or web address]/[email or web address]-[email or web address]-injury OR  https://www.cdc.gov/steadi/patient.html

## 2024-01-12 LAB
CELL MATERIAL STONE EST-MCNT: SIGNIFICANT CHANGE UP
CELL MATERIAL STONE EST-MCNT: SIGNIFICANT CHANGE UP
LABORATORY COMMENT REPORT: SIGNIFICANT CHANGE UP
LABORATORY COMMENT REPORT: SIGNIFICANT CHANGE UP
NIDUS STONE QN: SIGNIFICANT CHANGE UP
NIDUS STONE QN: SIGNIFICANT CHANGE UP

## 2024-03-07 ENCOUNTER — NON-APPOINTMENT (OUTPATIENT)
Age: 59
End: 2024-03-07

## 2024-03-07 PROBLEM — N20.0 CALCULUS OF KIDNEY: Chronic | Status: ACTIVE | Noted: 2024-01-04

## 2024-03-07 PROBLEM — J45.909 UNSPECIFIED ASTHMA, UNCOMPLICATED: Chronic | Status: ACTIVE | Noted: 2024-01-04

## 2024-03-07 NOTE — ED ADULT TRIAGE NOTE - ESI TRIAGE ACUITY LEVEL, MLM
[facility]        Patient is here for a post-op.    Pt is currently taking Aspirin.    /82 (BP Location: Right arm, Patient Position: Chair, Cuff Size: Adult Regular)   Pulse 77   LMP 02/26/2008 (Approximate)     The provider has been notified that the patient has no concerns.     Questions patient would like addressed today are: N/A.    Refills are needed: N/A    Has homecare services and agency name:  Rebecca Hines MA            
Vascular Surgery Clinic Note    Comes into clinic today for staple removal. Staples removed without incident, incision is fully healed. No seroma palpated. Continues to have numbness and pain extending from her groin to knee which is continuous and started immediately post-op, this has not worsened. Discussed that this can take several weeks to months to fully resolve. No wound care necessary for incision. Follow-up with vascular surgery as needed.    Selam Barfield CNP  Total time: 20 minutes  
3

## 2024-03-08 ENCOUNTER — APPOINTMENT (OUTPATIENT)
Dept: NEUROLOGY | Facility: CLINIC | Age: 59
End: 2024-03-08
Payer: COMMERCIAL

## 2024-03-08 VITALS
HEIGHT: 70 IN | WEIGHT: 175 LBS | SYSTOLIC BLOOD PRESSURE: 130 MMHG | BODY MASS INDEX: 25.05 KG/M2 | DIASTOLIC BLOOD PRESSURE: 90 MMHG

## 2024-03-08 DIAGNOSIS — R20.2 PARESTHESIA OF SKIN: ICD-10-CM

## 2024-03-08 PROCEDURE — 99204 OFFICE O/P NEW MOD 45 MIN: CPT

## 2024-03-08 RX ORDER — GABAPENTIN 100 MG/1
100 CAPSULE ORAL 3 TIMES DAILY
Qty: 90 | Refills: 1 | Status: COMPLETED | COMMUNITY
Start: 2021-04-07 | End: 2024-03-08

## 2024-03-08 RX ORDER — AMOXICILLIN AND CLAVULANATE POTASSIUM 875; 125 MG/1; MG/1
875-125 TABLET, COATED ORAL
Refills: 0 | Status: COMPLETED | COMMUNITY
End: 2024-03-08

## 2024-03-08 NOTE — HISTORY OF PRESENT ILLNESS
[FreeTextEntry1] : This 59-year-old man was seen in neurological consultation today On January 9 he had surgery for kidney stones Couple weeks later he noted numbness of the left fifth toe.  He describes it as a Novocain like sensation.  It will wax and wane but never goes away.  There is no pain either in the foot or back He did have some right-sided sciatica many years ago but that is not active He has no weakness in the legs He has no trouble with gait or balance  Medical history otherwise unremarkable  Non-smoker, no significant alcohol use  Gait and does not do physical work

## 2024-03-08 NOTE — ASSESSMENT
[FreeTextEntry1] : Numbness of the left fifth toe as discussed above His neurological examination is entirely normal except for mildly decreased pin sensation over the area Etiology unclear I am suggesting EMG and nerve conduction studies for further evaluation to rule out a peripheral neuromuscular problem.

## 2024-03-08 NOTE — CONSULT LETTER
[Dear  ___] : Dear  [unfilled], [Consult Letter:] : I had the pleasure of evaluating your patient, [unfilled]. [Please see my note below.] : Please see my note below. [Consult Closing:] : Thank you very much for allowing me to participate in the care of this patient.  If you have any questions, please do not hesitate to contact me. [Sincerely,] : Sincerely, [FreeTextEntry3] : Balwinder Hall MD, PhD, DPN-N [facility], Division of Neurology [facility]

## 2024-03-08 NOTE — PHYSICAL EXAM
[General Appearance - Alert] : alert [FreeTextEntry1] : There is no lumbar palpation tenderness. There is full range of movement of the lumbar spine. Straight leg raising is negative bilaterally. John's maneuver negative bilaterally. [General Appearance - In No Acute Distress] : in no acute distress [General Appearance - Well-Appearing] : healthy appearing [Impaired Insight] : insight and judgment were intact [Oriented To Time, Place, And Person] : oriented to person, place, and time [Memory Recent] : recent memory was not impaired [Affect] : the affect was normal [Place] : oriented to place [Person] : oriented to person [Time] : oriented to time [Concentration Intact] : normal concentrating ability [Fluency] : fluency intact [Comprehension] : comprehension intact [Past History] : adequate knowledge of personal past history [Cranial Nerves Optic (II)] : visual acuity intact bilaterally,  visual fields full to confrontation, pupils equal round and reactive to light [Cranial Nerves Oculomotor (III)] : extraocular motion intact [Cranial Nerves Trigeminal (V)] : facial sensation intact symmetrically [Cranial Nerves Facial (VII)] : face symmetrical [Cranial Nerves Vestibulocochlear (VIII)] : hearing was intact bilaterally [Cranial Nerves Glossopharyngeal (IX)] : tongue and palate midline [Cranial Nerves Accessory (XI - Cranial And Spinal)] : head turning and shoulder shrug symmetric [Cranial Nerves Hypoglossal (XII)] : there was no tongue deviation with protrusion [Motor Tone] : muscle tone was normal in all four extremities [Motor Strength] : muscle strength was normal in all four extremities [No Muscle Atrophy] : normal bulk in all four extremities [Paresis Pronator Drift Right-Sided] : no pronator drift on the right [Paresis Pronator Drift Left-Sided] : no pronator drift on the left [Sensation Tactile Decrease] : light touch was intact [Sensation Vibration Decrease] : vibration was intact [Proprioception] : proprioception was intact [Abnormal Walk] : normal gait [Balance] : balance was intact [Past-pointing] : there was no past-pointing [Tremor] : no tremor present [Coordination - Dysmetria Impaired Finger-to-Nose Bilateral] : not present [Coordination - Dysmetria Impaired Heel-to-Shin Bilateral] : not present [Plantar Reflex Right Only] : normal on the right [2+] : Ankle jerk left 2+ [Plantar Reflex Left Only] : normal on the left [PERRL With Normal Accommodation] : pupils were equal in size, round, reactive to light, with normal accommodation [FreeTextEntry7] : Slightly decreased pin sensation over the left fifth toe [Extraocular Movements] : extraocular movements were intact [Full Visual Field] : full visual field

## 2024-03-31 ENCOUNTER — EMERGENCY (EMERGENCY)
Facility: HOSPITAL | Age: 59
LOS: 1 days | End: 2024-03-31
Attending: EMERGENCY MEDICINE
Payer: COMMERCIAL

## 2024-03-31 VITALS
TEMPERATURE: 98 F | OXYGEN SATURATION: 97 % | HEART RATE: 80 BPM | DIASTOLIC BLOOD PRESSURE: 87 MMHG | RESPIRATION RATE: 20 BRPM | SYSTOLIC BLOOD PRESSURE: 135 MMHG | WEIGHT: 195.11 LBS

## 2024-03-31 DIAGNOSIS — Z98.890 OTHER SPECIFIED POSTPROCEDURAL STATES: Chronic | ICD-10-CM

## 2024-03-31 PROCEDURE — 93971 EXTREMITY STUDY: CPT | Mod: 26,RT

## 2024-03-31 PROCEDURE — 99284 EMERGENCY DEPT VISIT MOD MDM: CPT

## 2024-03-31 PROCEDURE — 73564 X-RAY EXAM KNEE 4 OR MORE: CPT | Mod: 26,RT

## 2024-03-31 PROCEDURE — 73564 X-RAY EXAM KNEE 4 OR MORE: CPT

## 2024-03-31 PROCEDURE — 93971 EXTREMITY STUDY: CPT

## 2024-03-31 PROCEDURE — 99284 EMERGENCY DEPT VISIT MOD MDM: CPT | Mod: 25

## 2024-03-31 NOTE — ED PROVIDER NOTE - CARE PROVIDERS DIRECT ADDRESSES
[email or web address]

## 2024-03-31 NOTE — ED PROVIDER NOTE - CLINICAL SUMMARY MEDICAL DECISION MAKING FREE TEXT BOX
59 years old male history of kidney stone status post surgery on January and then he was on the bed for the past 3 weeks postsurgery presented emergency room complaining of pain in the back of his right knee that has been persistent for the 3 weeks. recent travel went to have daughter. denies any trauma fall or injury or twisting his right knee. does not like to take any medication that is why did not take any pain medication for the pain. occasional tingling on his legs that she had follow-up with the neurologist having scheduled for EMG testing  - for the patient pain management patient refusing at this point concern for the DVT will obtain the ultrasound right lower extremity and x-ray of the knee

## 2024-03-31 NOTE — ED ADULT NURSE NOTE - OBJECTIVE STATEMENT
Presents to ed with complaints of right knee pain, denies any trauma or injury to it. Denies chest pain, sob.

## 2024-03-31 NOTE — ED PROVIDER NOTE - PATIENT PORTAL LINK FT
You can access the FollowMyHealth Patient Portal offered by [facility] by registering at the following website: [email or web address]. By joining [employer]’s FollowMyHealth portal, you will also be able to view your health information using other applications (apps) compatible with our system.

## 2024-03-31 NOTE — ED PROVIDER NOTE - PROGRESS NOTE DETAILS
Ultrasound consistent with popliteal cyst explained to the patient applied Ace bandagesa nd f.u ortho recommenced

## 2024-03-31 NOTE — ED PROVIDER NOTE - CARE PLAN
1 Principal Discharge DX:	Right knee pain   Principal Discharge DX:	Right knee pain  Secondary Diagnosis:	Popliteal cyst, right

## 2024-03-31 NOTE — ED PROVIDER NOTE - NSFOLLOWUPINSTRUCTIONS_ED_ALL_ED_FT
Ace bandages during the day  Ibuprofen  600 mg every 6-8 hours  as needed for the pain and follow-up with orthopedic doctor  Schmidt Cyst       A Baker cyst, also called a popliteal cyst, is a growth that forms at the back of the knee. The cyst forms when the fluid-filled sac (bursa) that cushions the knee joint becomes enlarged.    What are the causes?  In most cases, a Baker cyst results from another knee problem that causes swelling inside the knee. This makes the fluid inside the knee joint (synovial fluid) flow into the bursa behind the knee, causing the bursa to enlarge.    What increases the risk?  You may be more likely to develop a Baker cyst if you already have a knee problem, such as:    A tear in cartilage that cushions the knee joint (meniscal tear).  A tear in the tissues that connect the bones of the knee joint (ligament tear).  Knee swelling from osteoarthritis, rheumatoid arthritis, or gout.    What are the signs or symptoms?  The main symptom of this condition is a lump behind the knee. This may be the only symptom of the condition. The lump may be painful, especially when the knee is straightened. If the lump is painful, the pain may come and go. The knee may also be stiff.    Symptoms may quickly get more severe if the cyst breaks open (ruptures). If the cyst ruptures, you may feel the following in your knee and calf:    Sudden or worsening pain.  Swelling.  Bruising.  Redness in the calf.    A Baker cyst does not always cause symptoms.    How is this diagnosed?  This condition may be diagnosed based on your symptoms and medical history. Your health care provider will also do a physical exam. This may include:    Feeling the cyst to check whether it is tender.  Checking your knee for signs of another knee condition that causes swelling.    You may have imaging tests, such as:    X-rays.  MRI.  Ultrasound.    How is this treated?  A Baker cyst that is not painful may go away without treatment. If the cyst gets large or painful, it will likely get better if the underlying knee problem is treated.    If needed, treatment for a Baker cyst may include:    Resting.  Keeping weight off of the knee. This means not leaning on the knee to support your body weight.  Taking NSAIDs, such as ibuprofen, to reduce pain and swelling.  Having a procedure to drain the fluid from the cyst with a needle (aspiration). You may also get an injection of a medicine that reduces swelling (steroid).  Having surgery. This may be needed if other treatments do not work. This usually involves correcting knee damage and removing the cyst.    Follow these instructions at home:         Activity    Rest as told by your health care provider.  Avoid activities that make pain or swelling worse.  Return to your normal activities as told by your health care provider. Ask your health care provider what activities are safe for you.  Do not use the injured limb to support your body weight until your health care provider says that you can. Use crutches as told by your health care provider.        General instructions    Take over-the-counter and prescription medicines only as told by your health care provider.  Keep all follow-up visits as told by your health care provider. This is important.    Contact a health care provider if:  You have knee pain, stiffness, or swelling that does not get better.    Get help right away if:  You have sudden or worsening pain and swelling in your calf area.    Summary  A Baker cyst, also called a popliteal cyst, is a growth that forms at the back of the knee.  In most cases, a Baker cyst results from another knee problem that causes swelling inside the knee.  A Baker cyst that is not painful may go away without treatment.  If needed, treatment for a Baker cyst may include resting, keeping weight off of the knee, medicines, or draining fluid from the cyst.  Surgery may be needed if other treatments are not effective.    ADDITIONAL NOTES AND INSTRUCTIONS    Please follow up with your Primary MD in 24-48 hr.  Seek immediate medical care for any new/worsening signs or symptoms.

## 2024-03-31 NOTE — ED PROVIDER NOTE - PHYSICAL EXAMINATION
Const: AOX3 nontoxic appearing, no apparent respiratory or physical distress. Stable gait   HEENT: NC/AT. Moist mucous membranes.  Eyes: ASTON. EOMI  Neck: Soft and supple. Full ROM without pain.  Cardiac: Regular rate and regular rhythm. +S1/S2. No LE edema.  Resp: Speaking in full sentences. No evidence of respiratory distress.   MSK   Right knee  mild tenderness posterior aspect of the right knee- DP pulse +2 right knee range of motion grossly intact. no calf tenderness noted  Skin: No rashes, abrasions or lacerations.  Neuro: Awake, alert & oriented x 3. Moves all extremities symmetrically.

## 2024-03-31 NOTE — ED PROVIDER NOTE - OBJECTIVE STATEMENT
59 years old male history of kidney stone status post surgery on January and then he was on the bed for the past 3 weeks postsurgery presented emergency room complaining of pain in the back of his right knee that has been persistent for the 3 weeks. recent travel went to have daughter. denies any trauma fall or injury or twisting his right knee. does not like to take any medication that is why did not take any pain medication for the pain. occasional tingling on his legs that she had follow-up with the neurologist having scheduled for EMG testing. denies any chest pain or shortness of breath

## 2024-03-31 NOTE — ED PROVIDER NOTE - CARE PROVIDER_API CALL
Patient: Barry Torres    Procedure Summary     Date:  04/03/19 Room / Location:  [place] [address] / SURGERY [facility]    Anesthesia Start:  0911 Anesthesia Stop:      Procedures:       FLEXOR TENDON REPAIR- SMALL FINGER VS (Left Finger)      TENDON TRANSFER (Left Finger) Diagnosis:  (HAND WEAKNESS LEFT, HAND PAIN BILATERAL)    Surgeon:  Marc Chowdhury M.D. Responsible Provider:  Valod Cole M.D.    Anesthesia Type:  general ASA Status:  3          Final Anesthesia Type: general  Last vitals  BP   Blood Pressure : 144/74    Temp   36.5 °C (97.7 °F)    Pulse   Pulse: 64   Resp   18    SpO2   93 %      Anesthesia Post Evaluation    Patient location during evaluation: PACU  Patient participation: complete - patient participated  Level of consciousness: awake and alert  Pain score: 0    Airway patency: patent  Anesthetic complications: no  Cardiovascular status: hemodynamically stable  Respiratory status: acceptable  Hydration status: euvolemic    PONV: none           Nurse Pain Score: 0 (NPRS)        
Cristofer Houston  Orthopaedic Surgery  [address] [place] [id number]  Phone: [phone]  Fax: [phone]  Follow Up Time: 4-6 Days

## 2024-04-15 ENCOUNTER — APPOINTMENT (OUTPATIENT)
Dept: ORTHOPEDIC SURGERY | Facility: CLINIC | Age: 59
End: 2024-04-15
Payer: COMMERCIAL

## 2024-04-15 VITALS
DIASTOLIC BLOOD PRESSURE: 88 MMHG | SYSTOLIC BLOOD PRESSURE: 133 MMHG | HEIGHT: 70 IN | BODY MASS INDEX: 25.77 KG/M2 | HEART RATE: 76 BPM | WEIGHT: 180 LBS

## 2024-04-15 DIAGNOSIS — M25.561 PAIN IN RIGHT KNEE: ICD-10-CM

## 2024-04-15 PROCEDURE — 99203 OFFICE O/P NEW LOW 30 MIN: CPT

## 2024-04-15 RX ORDER — MELOXICAM 15 MG/1
15 TABLET ORAL
Qty: 21 | Refills: 0 | Status: ACTIVE | COMMUNITY
Start: 2024-04-15 | End: 1900-01-01

## 2024-04-15 NOTE — HISTORY OF PRESENT ILLNESS
[de-identified] : 4/15/2024: Miguel is a pleasant 59-year-old male presents to the office today for evaluation of right knee pain.  The patient states that his pain began about a month ago.  He denies any history of trauma and states that he woke up with the pain in the night.  Pain got progressively worse and he did go to the emergency department to rule out a DVT due to recently performed kidney procedure.  Ultrasound was negative for DVT but did demonstrate a popliteal cyst.  His x-rays are negative for any acute pathology.  He was referred to me for specialist opinion.  He has been using a compressive Ace wrap and taking over-the-counter Advil as needed which he has found helpful.  However, he still has some soreness in the knee.  Never had any issues with this knee previously.  Has had no other treatment.  The patient denies any fevers, chills, sweats, recent illnesses, numbness, tingling, weakness, or pain elsewhere at this time.

## 2024-04-15 NOTE — DISCUSSION/SUMMARY
[de-identified] : Assessment: 59-year-old male with right knee pain secondary to a popliteal cyst, possible early arthritis versus less likely meniscus tear  Plan: I had a long discussion with the patient today regarding the nature of their diagnosis and treatment plan. We discussed the risks and benefits of no treatment as well as nonoperative and operative treatments.  I reviewed the patient's x-rays today with him in the office are negative for acute pathology.  His ultrasound report is negative for DVT but does demonstrate a popliteal cyst.  On examination he has pain about the posterior medial soft tissue structures in the vicinity of his popliteal cyst and hamstring tendons.  At this time I recommend conservative treatment of the patient's condition with modalities including rest, ice, heat, anti-inflammatory medications, activity modifications, and home stretching and strengthening exercises.  A prescription for meloxicam was sent to the patient's pharmacy today.  I discussed with the patient the risks and benefits associated with NSAID use. GI precautions were discussed.  A referral for physical therapy was provided to begin working on exercises to help improve their strength and function.  Recommend follow-up in 8 weeks for repeat clinical assessment.  If symptoms persist we may consider an MRI versus cortisone injection.  The patient verbalizes their understanding and agrees with the plan.  All questions were answered to their satisfaction.

## 2024-04-15 NOTE — PHYSICAL EXAM
[de-identified] : General: Awake, alert, no acute distress, Patient was cooperative and appropriate during the examination.  The patient is of normal weight for height and age.  Walks without an antalgic gait.   Right knee Examination: Physical examination of the knee demonstrates normal skin without signs of skin changes or abnormalities. No erythema, warmth, or joint effusion is appreciated.  Sensation is intact to light touch L2-S1 Palpable DP/PT pulse EHL/FHL/TA/GSC motor function intact  Range of Motion 0-130 degrees  Strength Testing Quadriceps/Hamstring 5/5 Patient is able to perform a straight leg raise without difficulty.  Palpation Not tender to palpation about the distal femur or proximal tibia Not tender to palpation about the patellofemoral compartment No palpable defect appreciated in the quadriceps or patellar tendons Not tender to palpation of medial joint line Not tender to palpation of lateral joint line Mildly to moderately tender palpation about the posterior medial soft tissue structures and hamstring tendons  Special Tests Anterior Drawer negative Posterior Drawer negative Lachman Exam negative No Varus or Valgus Laxity at 0 or 30 degrees of knee flexion Lucien's Test negative for pain or crepitus Active compression of the patella negative for pain or crepitus Translation of the patella 2 quadrants with a firm endpoint [de-identified] : X-rays including 3 views of the right knee from [facility] reviewed today.  No acute fracture or dislocation.  No significant arthritis.  Ultrasound report of the right lower extremity from [facility] reviewed today.  No evidence of DVT.  Patient has a right popliteal cyst measuring 3.1 x 1 x 2.7 cm.

## 2024-04-18 NOTE — ED ADULT TRIAGE NOTE - CHIEF COMPLAINT QUOTE
c/o of left calf pain since last Friday reports was dragging object and while pushing with leg felt a "pop", presents with redness, swelling and 8/10pain with movement, reports inability to hear out of left ear since this am
No

## 2024-05-09 ENCOUNTER — APPOINTMENT (OUTPATIENT)
Dept: NEUROLOGY | Facility: CLINIC | Age: 59
End: 2024-05-09
Payer: COMMERCIAL

## 2024-05-09 PROCEDURE — 95886 MUSC TEST DONE W/N TEST COMP: CPT

## 2024-05-09 PROCEDURE — 95908 NRV CNDJ TST 3-4 STUDIES: CPT

## 2024-07-21 ENCOUNTER — EMERGENCY (EMERGENCY)
Facility: HOSPITAL | Age: 59
LOS: 1 days | Discharge: HOME | End: 2024-07-21
Attending: EMERGENCY MEDICINE
Payer: COMMERCIAL

## 2024-07-21 VITALS
RESPIRATION RATE: 18 BRPM | WEIGHT: 187.61 LBS | HEIGHT: 70 IN | OXYGEN SATURATION: 99 % | DIASTOLIC BLOOD PRESSURE: 82 MMHG | TEMPERATURE: 98 F | SYSTOLIC BLOOD PRESSURE: 152 MMHG | HEART RATE: 80 BPM

## 2024-07-21 DIAGNOSIS — Z98.890 OTHER SPECIFIED POSTPROCEDURAL STATES: Chronic | ICD-10-CM

## 2024-07-21 LAB
ALBUMIN SERPL ELPH-MCNC: 3.9 G/DL — SIGNIFICANT CHANGE UP (ref 3.3–5.2)
ALP SERPL-CCNC: 80 U/L — SIGNIFICANT CHANGE UP (ref 40–120)
ALT FLD-CCNC: 20 U/L — SIGNIFICANT CHANGE UP
ANION GAP SERPL CALC-SCNC: 12 MMOL/L — SIGNIFICANT CHANGE UP (ref 5–17)
AST SERPL-CCNC: 24 U/L — SIGNIFICANT CHANGE UP
BASOPHILS # BLD AUTO: 0.09 K/UL — SIGNIFICANT CHANGE UP (ref 0–0.2)
BASOPHILS NFR BLD AUTO: 1.2 % — SIGNIFICANT CHANGE UP (ref 0–2)
BILIRUB SERPL-MCNC: 0.4 MG/DL — SIGNIFICANT CHANGE UP (ref 0.4–2)
BUN SERPL-MCNC: 17.1 MG/DL — SIGNIFICANT CHANGE UP (ref 8–20)
CALCIUM SERPL-MCNC: 9.4 MG/DL — SIGNIFICANT CHANGE UP (ref 8.4–10.5)
CHLORIDE SERPL-SCNC: 103 MMOL/L — SIGNIFICANT CHANGE UP (ref 96–108)
CO2 SERPL-SCNC: 24 MMOL/L — SIGNIFICANT CHANGE UP (ref 22–29)
CREAT SERPL-MCNC: 1.04 MG/DL — SIGNIFICANT CHANGE UP (ref 0.5–1.3)
EGFR: 82 ML/MIN/1.73M2 — SIGNIFICANT CHANGE UP
EOSINOPHIL # BLD AUTO: 0.48 K/UL — SIGNIFICANT CHANGE UP (ref 0–0.5)
EOSINOPHIL NFR BLD AUTO: 6.4 % — HIGH (ref 0–6)
FLUAV AG NPH QL: SIGNIFICANT CHANGE UP
FLUBV AG NPH QL: SIGNIFICANT CHANGE UP
GLUCOSE SERPL-MCNC: 88 MG/DL — SIGNIFICANT CHANGE UP (ref 70–99)
HCT VFR BLD CALC: 43.5 % — SIGNIFICANT CHANGE UP (ref 39–50)
HGB BLD-MCNC: 14.6 G/DL — SIGNIFICANT CHANGE UP (ref 13–17)
IMM GRANULOCYTES NFR BLD AUTO: 0.3 % — SIGNIFICANT CHANGE UP (ref 0–0.9)
LYMPHOCYTES # BLD AUTO: 2.65 K/UL — SIGNIFICANT CHANGE UP (ref 1–3.3)
LYMPHOCYTES # BLD AUTO: 35.4 % — SIGNIFICANT CHANGE UP (ref 13–44)
MCHC RBC-ENTMCNC: 28.3 PG — SIGNIFICANT CHANGE UP (ref 27–34)
MCHC RBC-ENTMCNC: 33.6 GM/DL — SIGNIFICANT CHANGE UP (ref 32–36)
MCV RBC AUTO: 84.3 FL — SIGNIFICANT CHANGE UP (ref 80–100)
MONOCYTES # BLD AUTO: 0.9 K/UL — SIGNIFICANT CHANGE UP (ref 0–0.9)
MONOCYTES NFR BLD AUTO: 12 % — SIGNIFICANT CHANGE UP (ref 2–14)
NEUTROPHILS # BLD AUTO: 3.35 K/UL — SIGNIFICANT CHANGE UP (ref 1.8–7.4)
NEUTROPHILS NFR BLD AUTO: 44.7 % — SIGNIFICANT CHANGE UP (ref 43–77)
PLATELET # BLD AUTO: 220 K/UL — SIGNIFICANT CHANGE UP (ref 150–400)
POTASSIUM SERPL-MCNC: 3.7 MMOL/L — SIGNIFICANT CHANGE UP (ref 3.5–5.3)
POTASSIUM SERPL-SCNC: 3.7 MMOL/L — SIGNIFICANT CHANGE UP (ref 3.5–5.3)
PROT SERPL-MCNC: 6.9 G/DL — SIGNIFICANT CHANGE UP (ref 6.6–8.7)
RBC # BLD: 5.16 M/UL — SIGNIFICANT CHANGE UP (ref 4.2–5.8)
RBC # FLD: 15.7 % — HIGH (ref 10.3–14.5)
RSV RNA NPH QL NAA+NON-PROBE: SIGNIFICANT CHANGE UP
SARS-COV-2 RNA SPEC QL NAA+PROBE: SIGNIFICANT CHANGE UP
SODIUM SERPL-SCNC: 139 MMOL/L — SIGNIFICANT CHANGE UP (ref 135–145)
TROPONIN T, HIGH SENSITIVITY RESULT: 9 NG/L — SIGNIFICANT CHANGE UP (ref 0–51)
WBC # BLD: 7.49 K/UL — SIGNIFICANT CHANGE UP (ref 3.8–10.5)
WBC # FLD AUTO: 7.49 K/UL — SIGNIFICANT CHANGE UP (ref 3.8–10.5)

## 2024-07-21 PROCEDURE — 80053 COMPREHEN METABOLIC PANEL: CPT

## 2024-07-21 PROCEDURE — 71045 X-RAY EXAM CHEST 1 VIEW: CPT

## 2024-07-21 PROCEDURE — 94640 AIRWAY INHALATION TREATMENT: CPT

## 2024-07-21 PROCEDURE — 93005 ELECTROCARDIOGRAM TRACING: CPT

## 2024-07-21 PROCEDURE — 36415 COLL VENOUS BLD VENIPUNCTURE: CPT

## 2024-07-21 PROCEDURE — 71275 CT ANGIOGRAPHY CHEST: CPT | Mod: MC

## 2024-07-21 PROCEDURE — 85025 COMPLETE CBC W/AUTO DIFF WBC: CPT

## 2024-07-21 PROCEDURE — 93010 ELECTROCARDIOGRAM REPORT: CPT

## 2024-07-21 PROCEDURE — 96365 THER/PROPH/DIAG IV INF INIT: CPT | Mod: XU

## 2024-07-21 PROCEDURE — 71045 X-RAY EXAM CHEST 1 VIEW: CPT | Mod: 26

## 2024-07-21 PROCEDURE — 99285 EMERGENCY DEPT VISIT HI MDM: CPT | Mod: 25

## 2024-07-21 PROCEDURE — 93971 EXTREMITY STUDY: CPT | Mod: 26,RT

## 2024-07-21 PROCEDURE — 84484 ASSAY OF TROPONIN QUANT: CPT

## 2024-07-21 PROCEDURE — 99285 EMERGENCY DEPT VISIT HI MDM: CPT

## 2024-07-21 PROCEDURE — 93971 EXTREMITY STUDY: CPT

## 2024-07-21 PROCEDURE — 87637 SARSCOV2&INF A&B&RSV AMP PRB: CPT

## 2024-07-21 PROCEDURE — 96375 TX/PRO/DX INJ NEW DRUG ADDON: CPT | Mod: XU

## 2024-07-21 RX ORDER — IPRATROPIUM BROMIDE AND ALBUTEROL SULFATE .5; 3 MG/3ML; MG/3ML
3 SOLUTION RESPIRATORY (INHALATION)
Refills: 0 | Status: COMPLETED | OUTPATIENT
Start: 2024-07-21 | End: 2024-07-21

## 2024-07-21 RX ORDER — MAGNESIUM SULFATE 100 %
2 POWDER (GRAM) MISCELLANEOUS ONCE
Refills: 0 | Status: COMPLETED | OUTPATIENT
Start: 2024-07-21 | End: 2024-07-21

## 2024-07-21 RX ORDER — METHYLPREDNISOLONE ACETATE 20 MG/ML
125 VIAL (ML) INJECTION ONCE
Refills: 0 | Status: COMPLETED | OUTPATIENT
Start: 2024-07-21 | End: 2024-07-21

## 2024-07-21 RX ADMIN — IPRATROPIUM BROMIDE AND ALBUTEROL SULFATE 3 MILLILITER(S): .5; 3 SOLUTION RESPIRATORY (INHALATION) at 21:04

## 2024-07-21 RX ADMIN — Medication 150 GRAM(S): at 20:25

## 2024-07-21 RX ADMIN — Medication 2 GRAM(S): at 21:04

## 2024-07-21 RX ADMIN — Medication 125 MILLIGRAM(S): at 20:14

## 2024-07-21 RX ADMIN — IPRATROPIUM BROMIDE AND ALBUTEROL SULFATE 3 MILLILITER(S): .5; 3 SOLUTION RESPIRATORY (INHALATION) at 20:30

## 2024-07-21 RX ADMIN — IPRATROPIUM BROMIDE AND ALBUTEROL SULFATE 3 MILLILITER(S): .5; 3 SOLUTION RESPIRATORY (INHALATION) at 21:05

## 2024-07-21 NOTE — ED PROVIDER NOTE - PATIENT PORTAL LINK FT
You can access the FollowMyHealth Patient Portal offered by [facility] by registering at the following website: [email or web address]. By joining [employer]’s FollowMyHealth portal, you will also be able to view your health information using other applications (apps) compatible with our system.

## 2024-07-21 NOTE — ED PROVIDER NOTE - PHYSICAL EXAMINATION
Physical Examination:   Gen: NAD, AOx3  Head: NCAT  HEENT: EOMI, oral mucosa moist, normal conjunctiva, neck supple  Lung: no respiratory distress speaking in full sentences   CV:  Normal perfusion  Abd: soft, NT ND  MSK: No edema, no visible deformities  Neuro: No focal neurologic deficits  Skin: No rash   Psych: normal affect

## 2024-07-21 NOTE — ED PROVIDER NOTE - ATTENDING APP SHARED VISIT CONTRIBUTION OF CARE
I, Larry Nixon MD, performed the initial face to face bedside interview with this patient regarding history of present illness, review of symptoms and relevant past medical, social and family history.  I completed an independent physical examination.  I was the initial provider who evaluated this patient. I have signed out the follow up of any pending tests (i.e. labs, radiological studies) to the ACP.  I have communicated the patient’s plan of care and disposition with the ACP.    49 year old male c/o dyspnea for few days. + dyspnea. asthma exacerbation

## 2024-07-21 NOTE — ED PROVIDER NOTE - CLINICAL SUMMARY MEDICAL DECISION MAKING FREE TEXT BOX
sob labs and diagnostic imaging results reviewed with patient; symptoms improved; patient feels comfortable with discharge and medical plan; PMD or clinic follow up recommended for reassessment. Patient is aware of signs/symptoms to return to the emergency department.

## 2024-07-21 NOTE — ED PROVIDER NOTE - DISPOSITION TYPE
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 I have scanned the referral and records in to media mgr. Please contact pt to schedule and let me know if I can help any further.    Thank you,  Cassandra Ladd  [facility]  Ext [phone]           DISCHARGE

## 2024-07-21 NOTE — ED ADULT NURSE NOTE - TEMPLATE LIST FOR HEAD TO TOE ASSESSMENT
General Star Wedge Flap Text: The defect edges were debeveled with a #15 scalpel blade.  Given the location of the defect, shape of the defect and the proximity to free margins a star wedge flap was deemed most appropriate.  Using a sterile surgical marker, an appropriate rotation flap was drawn incorporating the defect and placing the expected incisions within the relaxed skin tension lines where possible. The area thus outlined was incised deep to adipose tissue with a #15 scalpel blade.  The skin margins were undermined to an appropriate distance in all directions utilizing iris scissors.

## 2024-07-21 NOTE — ED PROVIDER NOTE - OBJECTIVE STATEMENT
49 year old male with hx of asthma hospitalized never intubated presents c/o sob x 6 days, Pt states started on monday, he noticed the air quality was worse than normal, started shortly after. he is a non-smoker. he does admit to recent right knee pain and swelling a couple months ago, was worked up for dvt which was negative. however he states he has persistent right leg swelling now above the knee. recent urology surgery for stone removal and stent placement and removal a couple months ago with urologist from [place]. denies chest pain, viral symptoms, cough, congestion, sick exposures, back pain.

## 2024-07-21 NOTE — ED PROVIDER NOTE - NSFOLLOWUPINSTRUCTIONS_ED_ALL_ED_FT
- Please follow up with your Primary Care Doctor in 24-48 hr.  - Seek immediate medical care for any new, worsening or concerning signs or symptoms.   - Take medications as directed, be sure to read all instructions on packaging  - You were given copies of all your test results, please bring to your primary care doctor for review of any abnormal test results  - Follow up with  listed above, in 1 week    Feel better!    Asthma    Asthma is a condition in which the airways tighten and narrow, making it difficult to breath. Asthma episodes, also called asthma attacks, range from minor to life-threatening. Symptoms include wheezing, coughing, chest tightness, or shortness of breath. The diagnosis of asthma is made by a review of your medical history and a physical exam, but may involve additional testing. Asthma cannot be cured, but medicines and lifestyle changes can help control it. Avoid triggers of asthma which may include animal dander, pollen, mold, smoke, air pollutants, etc.     SEEK IMMEDIATE MEDICAL CARE IF YOU HAVE ANY OF THE FOLLOWING SYMPTOMS: worsening of symptoms, shortness of breath at rest, chest pain, bluish discoloration to lips or fingertips, lightheadedness/dizziness, or fever.

## 2024-07-22 VITALS
TEMPERATURE: 98 F | SYSTOLIC BLOOD PRESSURE: 118 MMHG | RESPIRATION RATE: 18 BRPM | OXYGEN SATURATION: 100 % | DIASTOLIC BLOOD PRESSURE: 72 MMHG | HEART RATE: 78 BPM

## 2024-07-22 PROCEDURE — 71275 CT ANGIOGRAPHY CHEST: CPT | Mod: 26,MC

## 2024-07-22 RX ORDER — ALBUTEROL 90 MCG
2 AEROSOL REFILL (GRAM) INHALATION
Qty: 1 | Refills: 0
Start: 2024-07-22 | End: 2024-08-20

## 2024-07-22 RX ORDER — ALBUTEROL 90 MCG
3 AEROSOL REFILL (GRAM) INHALATION
Qty: 40 | Refills: 0
Start: 2024-07-22 | End: 2024-07-31

## 2024-07-22 RX ORDER — GUAIFENESIN/DEXTROMETHORPHAN 100-10MG/5
1 SYRUP ORAL
Refills: 0 | DISCHARGE
Start: 2024-07-22 | End: 2024-07-29

## 2024-07-22 RX ORDER — PREDNISONE 10 MG/1
2 TABLET ORAL
Qty: 10 | Refills: 0
Start: 2024-07-22 | End: 2024-07-26

## 2024-07-22 RX ORDER — PREDNISONE 10 MG
2 TABLET, DOSE PACK ORAL
Qty: 10 | Refills: 0 | DISCHARGE
Start: 2024-07-22 | End: 2024-07-26

## 2024-07-23 RX ORDER — AZITHROMYCIN 500 MG/1
0 TABLET, FILM COATED ORAL
Refills: 0 | DISCHARGE
Start: 2024-07-23 | End: 2024-07-27

## 2024-08-13 RX ORDER — CEFDINIR 300 MG/1
1 CAPSULE ORAL
Refills: 0 | DISCHARGE
Start: 2024-08-13 | End: 2024-08-23

## 2024-08-13 RX ORDER — METHYLPREDNISOLONE 4 MG
0 TABLET ORAL
Refills: 0 | DISCHARGE
Start: 2024-08-13 | End: 2024-08-18

## 2024-08-20 ENCOUNTER — INPATIENT (INPATIENT)
Facility: HOSPITAL | Age: 59
LOS: 0 days | Discharge: HOME | DRG: 202 | End: 2024-08-21
Attending: FAMILY MEDICINE | Admitting: STUDENT IN AN ORGANIZED HEALTH CARE EDUCATION/TRAINING PROGRAM
Payer: COMMERCIAL

## 2024-08-20 VITALS
HEART RATE: 96 BPM | WEIGHT: 188.72 LBS | OXYGEN SATURATION: 100 % | TEMPERATURE: 98 F | SYSTOLIC BLOOD PRESSURE: 151 MMHG | RESPIRATION RATE: 17 BRPM | DIASTOLIC BLOOD PRESSURE: 87 MMHG

## 2024-08-20 DIAGNOSIS — J45.901 UNSPECIFIED ASTHMA WITH (ACUTE) EXACERBATION: ICD-10-CM

## 2024-08-20 DIAGNOSIS — Z98.890 OTHER SPECIFIED POSTPROCEDURAL STATES: Chronic | ICD-10-CM

## 2024-08-20 LAB
ALBUMIN SERPL ELPH-MCNC: 3.4 G/DL — SIGNIFICANT CHANGE UP (ref 3.3–5)
ALP SERPL-CCNC: 77 U/L — SIGNIFICANT CHANGE UP (ref 40–120)
ALT FLD-CCNC: 35 U/L — SIGNIFICANT CHANGE UP (ref 12–78)
ANION GAP SERPL CALC-SCNC: 4 MMOL/L — LOW (ref 5–17)
APTT BLD: 28.8 SEC — SIGNIFICANT CHANGE UP (ref 24.5–35.6)
AST SERPL-CCNC: 21 U/L — SIGNIFICANT CHANGE UP (ref 15–37)
BASE EXCESS BLDV CALC-SCNC: 1.3 MMOL/L — SIGNIFICANT CHANGE UP (ref -2–3)
BASOPHILS # BLD AUTO: 0.1 K/UL — SIGNIFICANT CHANGE UP (ref 0–0.2)
BASOPHILS NFR BLD AUTO: 1.3 % — SIGNIFICANT CHANGE UP (ref 0–2)
BILIRUB SERPL-MCNC: 0.4 MG/DL — SIGNIFICANT CHANGE UP (ref 0.2–1.2)
BUN SERPL-MCNC: 22 MG/DL — SIGNIFICANT CHANGE UP (ref 7–23)
CALCIUM SERPL-MCNC: 9.5 MG/DL — SIGNIFICANT CHANGE UP (ref 8.5–10.1)
CHLORIDE SERPL-SCNC: 108 MMOL/L — SIGNIFICANT CHANGE UP (ref 96–108)
CO2 SERPL-SCNC: 25 MMOL/L — SIGNIFICANT CHANGE UP (ref 22–31)
CREAT SERPL-MCNC: 1.14 MG/DL — SIGNIFICANT CHANGE UP (ref 0.5–1.3)
D DIMER BLD IA.RAPID-MCNC: <150 NG/ML DDU — SIGNIFICANT CHANGE UP
EGFR: 73 ML/MIN/1.73M2 — SIGNIFICANT CHANGE UP
EOSINOPHIL # BLD AUTO: 0.3 K/UL — SIGNIFICANT CHANGE UP (ref 0–0.5)
EOSINOPHIL NFR BLD AUTO: 4 % — SIGNIFICANT CHANGE UP (ref 0–6)
FLUAV AG NPH QL: SIGNIFICANT CHANGE UP
FLUBV AG NPH QL: SIGNIFICANT CHANGE UP
GLUCOSE SERPL-MCNC: 95 MG/DL — SIGNIFICANT CHANGE UP (ref 70–99)
HCO3 BLDV-SCNC: 26 MMOL/L — SIGNIFICANT CHANGE UP (ref 22–29)
HCT VFR BLD CALC: 46.1 % — SIGNIFICANT CHANGE UP (ref 39–50)
HGB BLD-MCNC: 15.3 G/DL — SIGNIFICANT CHANGE UP (ref 13–17)
IMM GRANULOCYTES NFR BLD AUTO: 0.9 % — SIGNIFICANT CHANGE UP (ref 0–0.9)
INR BLD: 0.91 RATIO — SIGNIFICANT CHANGE UP (ref 0.85–1.18)
LACTATE SERPL-SCNC: 1.4 MMOL/L — SIGNIFICANT CHANGE UP (ref 0.7–2)
LACTATE SERPL-SCNC: 2.1 MMOL/L — HIGH (ref 0.7–2)
LYMPHOCYTES # BLD AUTO: 2.48 K/UL — SIGNIFICANT CHANGE UP (ref 1–3.3)
LYMPHOCYTES # BLD AUTO: 33.1 % — SIGNIFICANT CHANGE UP (ref 13–44)
MCHC RBC-ENTMCNC: 28.5 PG — SIGNIFICANT CHANGE UP (ref 27–34)
MCHC RBC-ENTMCNC: 33.2 GM/DL — SIGNIFICANT CHANGE UP (ref 32–36)
MCV RBC AUTO: 85.8 FL — SIGNIFICANT CHANGE UP (ref 80–100)
MONOCYTES # BLD AUTO: 0.83 K/UL — SIGNIFICANT CHANGE UP (ref 0–0.9)
MONOCYTES NFR BLD AUTO: 11.1 % — SIGNIFICANT CHANGE UP (ref 2–14)
NEUTROPHILS # BLD AUTO: 3.72 K/UL — SIGNIFICANT CHANGE UP (ref 1.8–7.4)
NEUTROPHILS NFR BLD AUTO: 49.6 % — SIGNIFICANT CHANGE UP (ref 43–77)
NT-PROBNP SERPL-SCNC: 50 PG/ML — SIGNIFICANT CHANGE UP (ref 0–125)
PCO2 BLDV: 40 MMHG — LOW (ref 42–55)
PH BLDV: 7.42 — SIGNIFICANT CHANGE UP (ref 7.32–7.43)
PLATELET # BLD AUTO: 264 K/UL — SIGNIFICANT CHANGE UP (ref 150–400)
PO2 BLDV: 61 MMHG — HIGH (ref 25–45)
POTASSIUM SERPL-MCNC: 3.9 MMOL/L — SIGNIFICANT CHANGE UP (ref 3.5–5.3)
POTASSIUM SERPL-SCNC: 3.9 MMOL/L — SIGNIFICANT CHANGE UP (ref 3.5–5.3)
PROT SERPL-MCNC: 7.2 GM/DL — SIGNIFICANT CHANGE UP (ref 6–8.3)
PROTHROM AB SERPL-ACNC: 10.3 SEC — SIGNIFICANT CHANGE UP (ref 9.5–13)
RBC # BLD: 5.37 M/UL — SIGNIFICANT CHANGE UP (ref 4.2–5.8)
RBC # FLD: 15.9 % — HIGH (ref 10.3–14.5)
RSV RNA NPH QL NAA+NON-PROBE: SIGNIFICANT CHANGE UP
SAO2 % BLDV: 93 % — HIGH (ref 67–88)
SARS-COV-2 RNA SPEC QL NAA+PROBE: SIGNIFICANT CHANGE UP
SODIUM SERPL-SCNC: 137 MMOL/L — SIGNIFICANT CHANGE UP (ref 135–145)
TROPONIN I, HIGH SENSITIVITY RESULT: 5.51 NG/L — SIGNIFICANT CHANGE UP
WBC # BLD: 7.5 K/UL — SIGNIFICANT CHANGE UP (ref 3.8–10.5)
WBC # FLD AUTO: 7.5 K/UL — SIGNIFICANT CHANGE UP (ref 3.8–10.5)

## 2024-08-20 PROCEDURE — 80048 BASIC METABOLIC PNL TOTAL CA: CPT

## 2024-08-20 PROCEDURE — 92523 SPEECH SOUND LANG COMPREHEN: CPT | Mod: GN

## 2024-08-20 PROCEDURE — 83605 ASSAY OF LACTIC ACID: CPT

## 2024-08-20 PROCEDURE — 99223 1ST HOSP IP/OBS HIGH 75: CPT

## 2024-08-20 PROCEDURE — 94640 AIRWAY INHALATION TREATMENT: CPT

## 2024-08-20 PROCEDURE — 36415 COLL VENOUS BLD VENIPUNCTURE: CPT

## 2024-08-20 PROCEDURE — 99285 EMERGENCY DEPT VISIT HI MDM: CPT

## 2024-08-20 PROCEDURE — 85025 COMPLETE CBC W/AUTO DIFF WBC: CPT

## 2024-08-20 PROCEDURE — 93010 ELECTROCARDIOGRAM REPORT: CPT

## 2024-08-20 PROCEDURE — 71045 X-RAY EXAM CHEST 1 VIEW: CPT | Mod: 26

## 2024-08-20 PROCEDURE — 92610 EVALUATE SWALLOWING FUNCTION: CPT | Mod: GN

## 2024-08-20 RX ORDER — METHYLPREDNISOLONE 4 MG
40 TABLET ORAL EVERY 12 HOURS
Refills: 0 | Status: DISCONTINUED | OUTPATIENT
Start: 2024-08-20 | End: 2024-08-21

## 2024-08-20 RX ORDER — METHYLPREDNISOLONE 4 MG
125 TABLET ORAL ONCE
Refills: 0 | Status: COMPLETED | OUTPATIENT
Start: 2024-08-20 | End: 2024-08-20

## 2024-08-20 RX ORDER — PANTOPRAZOLE SODIUM 40 MG
40 TABLET, DELAYED RELEASE (ENTERIC COATED) ORAL
Refills: 0 | Status: DISCONTINUED | OUTPATIENT
Start: 2024-08-20 | End: 2024-08-21

## 2024-08-20 RX ORDER — IPRATROPIUM BROMIDE AND ALBUTEROL SULFATE .5; 3 MG/3ML; MG/3ML
3 SOLUTION RESPIRATORY (INHALATION)
Refills: 0 | Status: COMPLETED | OUTPATIENT
Start: 2024-08-20 | End: 2024-08-20

## 2024-08-20 RX ORDER — ACETAMINOPHEN 325 MG/1
650 TABLET ORAL ONCE
Refills: 0 | Status: DISCONTINUED | OUTPATIENT
Start: 2024-08-20 | End: 2024-08-21

## 2024-08-20 RX ORDER — PSYLLIUM HUSK 0.4 G
1 CAPSULE ORAL
Refills: 0 | DISCHARGE

## 2024-08-20 RX ORDER — MAGNESIUM, ALUMINUM HYDROXIDE 200-225/5
30 SUSPENSION, ORAL (FINAL DOSE FORM) ORAL EVERY 4 HOURS
Refills: 0 | Status: DISCONTINUED | OUTPATIENT
Start: 2024-08-20 | End: 2024-08-21

## 2024-08-20 RX ORDER — PANTOPRAZOLE SODIUM 40 MG
40 TABLET, DELAYED RELEASE (ENTERIC COATED) ORAL ONCE
Refills: 0 | Status: COMPLETED | OUTPATIENT
Start: 2024-08-20 | End: 2024-08-20

## 2024-08-20 RX ORDER — SODIUM CHLORIDE 9 MG/ML
500 INJECTION INTRAMUSCULAR; INTRAVENOUS; SUBCUTANEOUS ONCE
Refills: 0 | Status: COMPLETED | OUTPATIENT
Start: 2024-08-20 | End: 2024-08-20

## 2024-08-20 RX ORDER — MONTELUKAST SODIUM 5 MG/1
10 TABLET, CHEWABLE ORAL AT BEDTIME
Refills: 0 | Status: DISCONTINUED | OUTPATIENT
Start: 2024-08-20 | End: 2024-08-21

## 2024-08-20 RX ORDER — FLUTICASONE PROPIONATE AND SALMETEROL 250; 50 UG/1; UG/1
1 POWDER RESPIRATORY (INHALATION)
Refills: 0 | DISCHARGE

## 2024-08-20 RX ORDER — IPRATROPIUM BROMIDE AND ALBUTEROL SULFATE .5; 3 MG/3ML; MG/3ML
3 SOLUTION RESPIRATORY (INHALATION) ONCE
Refills: 0 | Status: COMPLETED | OUTPATIENT
Start: 2024-08-20 | End: 2024-08-21

## 2024-08-20 RX ORDER — IPRATROPIUM BROMIDE AND ALBUTEROL SULFATE .5; 3 MG/3ML; MG/3ML
3 SOLUTION RESPIRATORY (INHALATION) EVERY 6 HOURS
Refills: 0 | Status: DISCONTINUED | OUTPATIENT
Start: 2024-08-20 | End: 2024-08-21

## 2024-08-20 RX ORDER — ONDANSETRON 2 MG/ML
4 INJECTION, SOLUTION INTRAMUSCULAR; INTRAVENOUS EVERY 8 HOURS
Refills: 0 | Status: DISCONTINUED | OUTPATIENT
Start: 2024-08-20 | End: 2024-08-21

## 2024-08-20 RX ADMIN — Medication 125 MILLIGRAM(S): at 17:19

## 2024-08-20 RX ADMIN — MONTELUKAST SODIUM 10 MILLIGRAM(S): 5 TABLET, CHEWABLE ORAL at 21:58

## 2024-08-20 RX ADMIN — Medication 40 MILLIGRAM(S): at 22:10

## 2024-08-20 RX ADMIN — SODIUM CHLORIDE 500 MILLILITER(S): 9 INJECTION INTRAMUSCULAR; INTRAVENOUS; SUBCUTANEOUS at 16:57

## 2024-08-20 RX ADMIN — IPRATROPIUM BROMIDE AND ALBUTEROL SULFATE 3 MILLILITER(S): .5; 3 SOLUTION RESPIRATORY (INHALATION) at 16:54

## 2024-08-20 RX ADMIN — IPRATROPIUM BROMIDE AND ALBUTEROL SULFATE 3 MILLILITER(S): .5; 3 SOLUTION RESPIRATORY (INHALATION) at 16:59

## 2024-08-20 RX ADMIN — IPRATROPIUM BROMIDE AND ALBUTEROL SULFATE 3 MILLILITER(S): .5; 3 SOLUTION RESPIRATORY (INHALATION) at 16:52

## 2024-08-20 RX ADMIN — Medication 100 MILLILITER(S): at 22:02

## 2024-08-20 RX ADMIN — Medication 150 GRAM(S): at 16:53

## 2024-08-20 NOTE — H&P ADULT - NSHPPHYSICALEXAM_GEN_ALL_CORE
Vital Signs Last 24 Hrs  T(C): 36.8 (20 Aug 2024 15:45), Max: 36.8 (20 Aug 2024 15:45)  T(F): 98.3 (20 Aug 2024 15:45), Max: 98.3 (20 Aug 2024 15:45)  HR: 96 (20 Aug 2024 15:45) (96 - 96)  BP: 151/87 (20 Aug 2024 15:45) (151/87 - 151/87)  BP(mean): 102 (20 Aug 2024 15:45) (102 - 102)  RR: 17 (20 Aug 2024 15:45) (17 - 17)  SpO2: 100% (20 Aug 2024 15:45) (100% - 100%)    Parameters below as of 20 Aug 2024 15:45  Patient On (Oxygen Delivery Method): room air

## 2024-08-20 NOTE — ED ADULT NURSE REASSESSMENT NOTE - NS ED NURSE REASSESS COMMENT FT1
Patient resting in stretcher A+Ox4, NAD. Patient voice strained. Patient denies pain. VSS as per flowsheet. Patient profile completed. Medicated as per MAR. IVF infusing. Patient updated on plan of care. Care continues as ordered. Patient awaiting bed assignment and further orders. Safety and fall precautions in place. Call bell in hand.

## 2024-08-20 NOTE — H&P ADULT - HISTORY OF PRESENT ILLNESS
59 year old male w asthma (never intubated) 59 year old male w asthma (no hx of intubation) was seen today by PULM Dr Garner and was referred pt to ED for admission for medical management.    SOB started approx 1 month ago, possibly after exposure to bad air quality day in [facility] (when smoke [place] it [place]  Seen at [facility] on 7/21 for asthma flare up where he had CTAchest neg for PE, and venous doppler neg for DVT showing prior Baker's cyst  Improved after IV Mg, duo nebs, and steroids. Dc'd home on PO steroids.   PCP follow up with oral steroid taper and oral Cefdinir.   Sputum :grey to white, cough +/- improved, but has worsened last few days with worsening SOB.  feels SOB but not like w his usual asthma, in his neck like he wants to cough something out;   no allergy symptoms of nasal congestion or DC  No fever, chills, chest pain. SOB and coughing exacerbated by prolonged conversation.     No travel beyond [facility] and [facility] x 2 months  No birds  has dogs longer than symptoms    In ED /87   HR 96   RR 17   T 98.3   100% sat RA  CXR clear, solumedrol 125, Mg 2 G, duoneb      PAST MEDICAL HX  Asthma   Calculus of kidney   Kidney stones       PAST SURGICAL HX  Ureteral stent placement  w stone removal  H/O colonoscopy   History of lithotripsy         FAMILY HX  chronic obstructive pulmonary disease : Father        SOCIAL HX  never smoker  2 dogs

## 2024-08-20 NOTE — ED PROVIDER NOTE - OBJECTIVE STATEMENT
60 y/o M with PMHx of kidney stones s/p Jan 2024 with ureteral stent placement and subsequent stone removal, asthma (no hx of intubation), recently seen at [facility] on 7/21 for asthma flare up where he had CTA chest and venous doppler performed. CTA was negative for PE and venous doppler showed prior recent Baker's cyst resolved and no DVT. PT felt better with Magnesium, duo nebs, and steroids. Dc'd home on PO steroids. PCP follow up with oral steroid taper and oral Cefdinir. Sputum improved from grey to white, cough +/- improved, but has worsened last few days with worsening SOB. No fever, chills, chest pain. SOB and coughing exacerbated by prolonged conversation. Pt seen today by pulmonologist Dr Riley and was referred pt to ED for admission for medical management. 58 y/o M with PMHx of kidney stones s/p Jan 2024 with ureteral stent placement and subsequent removal, asthma (no hx of intubation), recently seen at [facility] on 7/21 for asthma flare-up where he had CTA chest and venous doppler performed. CTA was negative for PE and venous doppler showed prior recent Baker's cyst resolved and no DVT. Pt felt better s/p[ tx w/ Magnesium, duo-nebs, and steroids. DC'ed home on PO steroids. PCP office follow-up: oral steroid taper and oral Cefdinir --> sputum improved from grey to white, cough +/- improved, but has worsened last few days with worsening SOB. No fever, chills, chest pain. SOB and coughing exacerbated by prolonged conversation. Pt seen today by pulmonologist Dr Garner and was referred pt to ED for admission for further medical management as inpt.

## 2024-08-20 NOTE — H&P ADULT - ASSESSMENT
#Worsening SOB  3. solumerol  SOB over a month after exposure to airway irritants   w some improvement then deterioration of symptoms  CTA no PE, neg dopplers 1 month ago  sputum color improved from grey to white, now scant  lungs clear, no stridor  wakes up in AM w/o difficulty then progresses to symptoms of SOB, cough  noticeable distress when speaking + paroxysmal coughing spell  no distress when whispering  #suspect vocal cord dysfunction   #poss laryngeal reflux  1. admit to med  2. solumedrol 125 mg in ED  3.  then 40 mg BID  4. montelukast  5. protonix 40 mg IV  6. PULM consult  7. advised to speak in whispers to minimize cough  8. speech evaluation  9. duonebs    #Elevated lactic acid  1.  cc w improvement  2.  cc/hr x 10 hrs      #Hx kidney stones  has added surgery planned in 2 weeks      #VTE  ambulate        #80 minutes

## 2024-08-20 NOTE — ED PROVIDER NOTE - DIFFERENTIAL DIAGNOSIS
Clinical concern for acute asthma exacerbation, failing outpatient management. Less likely VTE disease as workup last month including CTA was negative. Also less likely cardiac etiology. Differential Diagnosis

## 2024-08-20 NOTE — ED ADULT NURSE NOTE - CADM POA CENTRAL LINE
No [TextEntry] : *Patient was given pre op instructions at today's visit.  *No eating after midnight the night before. Patient can have water up until two hrs before scheduled procedure.  *No OTC Aspirin five days before procedure, no Ibuprofen, Advil , Aleve, Motrin for 24 hrs prior to procedure.  *Patient was instructed to take his meds two hrs prior to procedure with water. *You must make arrangements prior to procedure for a family member/friend to drive you home after your procedure.  *Please bring your ID and insurance card with you on the day of procedure.  *Please leave all jewelry and valuables at home on the day of procedure.

## 2024-08-20 NOTE — ED PROVIDER NOTE - CLINICAL SUMMARY MEDICAL DECISION MAKING FREE TEXT BOX
60 y/o M with PMHx of kidney stones s/p Jan 2024 with ureteral stent placement and subsequent stone removal, asthma (no hx of intubation), recently seen at [facility] on 7/21 for asthma flare up where he had CTA chest and venous doppler performed. CTA was negative for PE and venous doppler showed prior recent Baker's cyst resolved and no DVT. PT felt better with Magnesium, duo nebs, and steroids. Dc'd home on PO steroids. PCP follow up with oral steroid taper and oral Cefdinir. Sputum improved from grey to white, cough +/- improved, but has worsened last few days with worsening SOB.  clinical concern for acute asthma exacerbation, failing outpatient management. Less likely VTE disease as workup last month including CTA was negative. Also less likely cardiac etiology.   Plan: EKG, CXR, labs including flu/covid swab, blood cultures, troponin, d-dimer, lactate, VBG, IVF, duo nebs, IV steroids and Magnesium, monitor, observe, reassess. Pt referred by pulmonology for admission. 60 y/o M with PMHx of kidney stones s/p Jan 2024 with ureteral stent placement and subsequent stone removal, asthma (no hx of intubation), recently seen at [facility] on 7/21 for asthma flare up where he had CTA chest and venous doppler performed. CTA was negative for PE and venous doppler showed prior recent Baker's cyst resolved and no DVT. PT felt better with Magnesium, duo nebs, and steroids. Dc'd home on PO steroids. PCP follow up with oral steroid taper and oral Cefdinir. Sputum improved from grey to white, cough +/- improved, but has worsened last few days with worsening SOB.  clinical concern for acute asthma exacerbation, failing outpatient management. Less likely VTE disease as workup last month including CTA was negative. Also less likely cardiac etiology.   Plan: EKG, CXR, labs including flu/covid swab, blood cultures, troponin, d-dimer, lactate, VBG, IVF, duo nebs, IV steroids and Magnesium, monitor, observe, reassess. Pt referred by pulmonology for admission.    18:38, SAMMI Nielsen MD:  Labs results unremarkable.  Chest x-ray wet read by leo COHEN.  Patient reassessed, reports feels that he is breathing better, however patient unable to talk in full sentence due to talking precipitates coughing fit and SOB.  Patient informed he is not a good candidate at this point for outpatient management.  Case discussed with and accepted by Dr. Espinosa for Med admit. 58 y/o M with PMHx of kidney stones s/p Jan 2024 with ureteral stent placement and subsequent removal, asthma (no hx of intubation), recently seen at [facility] on 7/21 for asthma flare-up: w/u neg. incl. CTA chest, Venous Doppler, + improved w/ medical tx & Dc'd home on PO steroids. PCP follow-up: oral steroid taper and oral Cefdinir: cough +/- improved, but has worsened last few days with worsening SOB.    Clinical concern for acute asthma exacerbation, failing outpatient management. Less likely VTE disease as workup last month including CTA was negative. Also less likely cardiac etiology.   Plan: EKG, CXR, labs including flu/covid swab, blood cultures, troponin, d-dimer, lactate, VBG, IVF, duo nebs, IV steroids and Magnesium, monitor, observe, reassess. Pt referred by Pulmonology for admission.    18:38, C MD Morales:  Labs results unremarkable.  Chest x-ray wet read by me VICKI.  Patient reassessed, reports feels that he is breathing better, however patient unable to talk in full sentence due to talking precipitates coughing fit and SOB.  Patient informed he is not a good candidate at this point for outpatient management.  Case discussed with and accepted by Dr. Espinosa for Med admit.

## 2024-08-20 NOTE — ED ADULT TRIAGE NOTE - CCCP TRG CHIEF CMPLNT
[id number] Now- [facility] pass          NAME: Alexander Barnett is a 19 y o  male  : 1999    MRN: [id number]  DATE: 2020  TIME: 1:40 PM    Assessment and Plan   Nausea [R11 0]  1  Nausea     2  Dizzy         Patient Instructions   Educated patient to get up slowly and educated on possible orthostatic hypotension  Otherwise exam WNL  To follow up with PCP in 3-5 days as he has not had labs in over a year  Patient did verbalize understanding and agreed to plan  To present to the ER if symptoms worsen  Chief Complaint     Chief Complaint   Patient presents with    Nausea     2 DAYS    Dizziness         History of Present Illness   Alexander Barnett presents to the clinic with girlfriend c/o    Needs a note for work  Denies any nausea or dizziness currently  No abdominal pain  Dizziness   This is a new problem  The current episode started in the past 7 days  The problem occurs intermittently (occurs when getting up from a seated position or from laying down, although not always)  The problem has been unchanged  Pertinent negatives include no abdominal pain, chest pain, chills, congestion, coughing, diaphoresis, fatigue, fever, headaches, numbness, rash, sore throat or vomiting  Nothing aggravates the symptoms  He has tried nothing for the symptoms  The treatment provided no relief  Review of Systems   Review of Systems   Constitutional: Negative for chills, diaphoresis, fatigue and fever  HENT: Negative for congestion, ear discharge, ear pain, facial swelling and sore throat  Eyes: Negative for photophobia, pain, discharge, redness, itching and visual disturbance  Respiratory: Negative for apnea, cough, chest tightness, shortness of breath and wheezing  Cardiovascular: Negative for chest pain and palpitations  Gastrointestinal: Negative for abdominal pain and vomiting  Skin: Negative for color change, rash and wound     Neurological: Negative for dizziness, numbness and headaches  Hematological: Negative for adenopathy  Current Medications     No long-term medications on file  Current Allergies     Allergies as of 09/08/2020    (No Known Allergies)            The following portions of the patient's history were reviewed and updated as appropriate: allergies, current medications, past family history, past medical history, past social history, past surgical history and problem list   History reviewed  No pertinent past medical history  History reviewed  No pertinent surgical history    Social History     Socioeconomic History    Marital status: Single     Spouse name: Not on file    Number of children: Not on file    Years of education: Not on file    Highest education level: Not on file   Occupational History    Not on file   Social Needs    Financial resource strain: Not on file    Food insecurity     Worry: Not on file     Inability: Not on file    Transportation needs     Medical: Not on file     Non-medical: Not on file   Tobacco Use    Smoking status: Former Smoker    Smokeless tobacco: Never Used   Substance and Sexual Activity    Alcohol use: Never     Frequency: Never    Drug use: Never    Sexual activity: Not on file   Lifestyle    Physical activity     Days per week: Not on file     Minutes per session: Not on file    Stress: Not on file   Relationships    Social connections     Talks on phone: Not on file     Gets together: Not on file     Attends Alevism service: Not on file     Active member of club or organization: Not on file     Attends meetings of clubs or organizations: Not on file     Relationship status: Not on file    Intimate partner violence     Fear of current or ex partner: Not on file     Emotionally abused: Not on file     Physically abused: Not on file     Forced sexual activity: Not on file   Other Topics Concern    Not on file   Social History Narrative    Not on file       Objective   /65   Pulse 78   Temp 98 4 °F (36 9 °C)   Ht 6' 1 03" (1 855 m)   Wt 67 6 kg (149 lb)   SpO2 99%   BMI 19 64 kg/m²      Physical Exam     Physical Exam  Vitals signs and nursing note reviewed  Constitutional:       General: He is not in acute distress  Appearance: He is well-developed  He is not diaphoretic  HENT:      Head: Normocephalic and atraumatic  Right Ear: External ear normal       Left Ear: External ear normal       Nose: Nose normal       Mouth/Throat:      Mouth: Mucous membranes are moist       Pharynx: Oropharynx is clear  No oropharyngeal exudate or posterior oropharyngeal erythema  Eyes:      General: No scleral icterus  Right eye: No discharge  Left eye: No discharge  Conjunctiva/sclera: Conjunctivae normal    Cardiovascular:      Rate and Rhythm: Normal rate and regular rhythm  Heart sounds: Normal heart sounds  No murmur  No friction rub  No gallop  Pulmonary:      Effort: Pulmonary effort is normal  No respiratory distress  Breath sounds: Normal breath sounds  No decreased breath sounds, wheezing, rhonchi or rales  Abdominal:      General: Bowel sounds are normal  There is no distension  Palpations: Abdomen is soft  There is no mass  Tenderness: There is no abdominal tenderness  Hernia: No hernia is present  Skin:     General: Skin is warm and dry  Coloration: Skin is not pale  Findings: No erythema or rash  Neurological:      Mental Status: He is alert and oriented to person, place, and time  Psychiatric:         Behavior: Behavior normal          Thought Content:  Thought content normal          Judgment: Judgment normal          Jessica Sanchez PA-C shortness of breath

## 2024-08-20 NOTE — ED ADULT NURSE NOTE - CHIEF COMPLAINT QUOTE
PT comes to the ED complaining of shortness of breathe and cough pt states hes struggling to breathe. pt is axo4. pt taken for stat ekg. directed to ED main. NEAL

## 2024-08-20 NOTE — PHARMACOTHERAPY INTERVENTION NOTE - COMMENTS
Medication reconciliation completed.  Reviewed Medication list and confirmed med allergies with patient; confirmed with Dr. First Medamelia.

## 2024-08-20 NOTE — PATIENT PROFILE ADULT - FALL HARM RISK - UNIVERSAL INTERVENTIONS
Bed in lowest position, wheels locked, appropriate side rails in place/Call bell, personal items and telephone in reach/Instruct patient to call for assistance before getting out of bed or chair/Non-slip footwear when patient is out of bed/[place] to call system/Physically safe environment - no spills, clutter or unnecessary equipment/Purposeful Proactive Rounding/Room/bathroom lighting operational, light cord in reach

## 2024-08-20 NOTE — H&P ADULT - HIV OFFER
Opt out Skin Substitute Text: The defect edges were debeveled with a #15 scalpel blade.  Given the location of the defect, shape of the defect and the proximity to free margins a skin substitute graft was deemed most appropriate.  The graft material was trimmed to fit the size of the defect. The graft was then placed in the primary defect and oriented appropriately.

## 2024-08-20 NOTE — ED ADULT NURSE NOTE - OBJECTIVE STATEMENT
58yo Male co SOB x1 month. Pt states he was at his pulmonologist and advised to come to ED. Pt states "The pulmonologist said my lungs are clear when he listened to it along with the Xrays, and that the problem is in my throat." Pt endorsing coughing, SOB. Pt denies CP, HA, dizziness, fever. Pt  states he has hx of asthma but symptoms feel different than asthma. AOx4, ambulatory 60yo Male co SOB x1 month. Pt states he was at his pulmonologist and advised to come to ED. Pt states "The pulmonologist said my lungs are clear when he listened to it along with the Xrays, and that the problem is in my throat." Pt endorsing coughing, SOB. Pt denies CP, HA, dizziness, fever. Pt  states he has hx of asthma but symptoms feel different than asthma. Pt placed on 2L NC, EKG completed in room. AOx4, ambulatory

## 2024-08-20 NOTE — ED ADULT TRIAGE NOTE - CHIEF COMPLAINT QUOTE
PT comes to the ED complaining of shortness of breathe and cough pt states hes struggling to breathe. pt is axo4. pt taken for stat ekg. directed to ED main PT comes to the ED complaining of shortness of breathe and cough pt states hes struggling to breathe. pt is axo4. pt taken for stat ekg. directed to ED main. NEAL

## 2024-08-21 ENCOUNTER — TRANSCRIPTION ENCOUNTER (OUTPATIENT)
Age: 59
End: 2024-08-21

## 2024-08-21 VITALS
RESPIRATION RATE: 18 BRPM | DIASTOLIC BLOOD PRESSURE: 57 MMHG | HEART RATE: 106 BPM | TEMPERATURE: 99 F | SYSTOLIC BLOOD PRESSURE: 124 MMHG | OXYGEN SATURATION: 97 %

## 2024-08-21 DIAGNOSIS — J45.901 UNSPECIFIED ASTHMA WITH (ACUTE) EXACERBATION: ICD-10-CM

## 2024-08-21 DIAGNOSIS — J38.3 OTHER DISEASES OF VOCAL CORDS: ICD-10-CM

## 2024-08-21 LAB
ANION GAP SERPL CALC-SCNC: 4 MMOL/L — LOW (ref 5–17)
BASOPHILS # BLD AUTO: 0.01 K/UL — SIGNIFICANT CHANGE UP (ref 0–0.2)
BASOPHILS NFR BLD AUTO: 0.1 % — SIGNIFICANT CHANGE UP (ref 0–2)
BUN SERPL-MCNC: 21 MG/DL — SIGNIFICANT CHANGE UP (ref 7–23)
CALCIUM SERPL-MCNC: 9.3 MG/DL — SIGNIFICANT CHANGE UP (ref 8.5–10.1)
CHLORIDE SERPL-SCNC: 110 MMOL/L — HIGH (ref 96–108)
CO2 SERPL-SCNC: 23 MMOL/L — SIGNIFICANT CHANGE UP (ref 22–31)
CREAT SERPL-MCNC: 1.12 MG/DL — SIGNIFICANT CHANGE UP (ref 0.5–1.3)
EGFR: 75 ML/MIN/1.73M2 — SIGNIFICANT CHANGE UP
EOSINOPHIL # BLD AUTO: 0 K/UL — SIGNIFICANT CHANGE UP (ref 0–0.5)
EOSINOPHIL NFR BLD AUTO: 0 % — SIGNIFICANT CHANGE UP (ref 0–6)
GLUCOSE SERPL-MCNC: 141 MG/DL — HIGH (ref 70–99)
HCT VFR BLD CALC: 43.3 % — SIGNIFICANT CHANGE UP (ref 39–50)
HCV AB S/CO SERPL IA: 0.34 S/CO — SIGNIFICANT CHANGE UP (ref 0–0.99)
HCV AB SERPL-IMP: SIGNIFICANT CHANGE UP
HGB BLD-MCNC: 14.1 G/DL — SIGNIFICANT CHANGE UP (ref 13–17)
IMM GRANULOCYTES NFR BLD AUTO: 0.7 % — SIGNIFICANT CHANGE UP (ref 0–0.9)
LYMPHOCYTES # BLD AUTO: 0.9 K/UL — LOW (ref 1–3.3)
LYMPHOCYTES # BLD AUTO: 11.2 % — LOW (ref 13–44)
MCHC RBC-ENTMCNC: 28.3 PG — SIGNIFICANT CHANGE UP (ref 27–34)
MCHC RBC-ENTMCNC: 32.6 GM/DL — SIGNIFICANT CHANGE UP (ref 32–36)
MCV RBC AUTO: 86.8 FL — SIGNIFICANT CHANGE UP (ref 80–100)
MONOCYTES # BLD AUTO: 0.12 K/UL — SIGNIFICANT CHANGE UP (ref 0–0.9)
MONOCYTES NFR BLD AUTO: 1.5 % — LOW (ref 2–14)
NEUTROPHILS # BLD AUTO: 6.98 K/UL — SIGNIFICANT CHANGE UP (ref 1.8–7.4)
NEUTROPHILS NFR BLD AUTO: 86.5 % — HIGH (ref 43–77)
PLATELET # BLD AUTO: 257 K/UL — SIGNIFICANT CHANGE UP (ref 150–400)
POTASSIUM SERPL-MCNC: 4.8 MMOL/L — SIGNIFICANT CHANGE UP (ref 3.5–5.3)
POTASSIUM SERPL-SCNC: 4.8 MMOL/L — SIGNIFICANT CHANGE UP (ref 3.5–5.3)
RBC # BLD: 4.99 M/UL — SIGNIFICANT CHANGE UP (ref 4.2–5.8)
RBC # FLD: 16.2 % — HIGH (ref 10.3–14.5)
SODIUM SERPL-SCNC: 137 MMOL/L — SIGNIFICANT CHANGE UP (ref 135–145)
WBC # BLD: 8.07 K/UL — SIGNIFICANT CHANGE UP (ref 3.8–10.5)
WBC # FLD AUTO: 8.07 K/UL — SIGNIFICANT CHANGE UP (ref 3.8–10.5)

## 2024-08-21 PROCEDURE — 99239 HOSP IP/OBS DSCHRG MGMT >30: CPT

## 2024-08-21 RX ORDER — MONTELUKAST SODIUM 5 MG/1
1 TABLET, CHEWABLE ORAL
Qty: 15 | Refills: 0
Start: 2024-08-21 | End: 2024-09-04

## 2024-08-21 RX ORDER — NYSTATIN 100000/ML
5 SUSPENSION, ORAL (FINAL DOSE FORM) ORAL
Qty: 140 | Refills: 0
Start: 2024-08-21 | End: 2024-08-27

## 2024-08-21 RX ORDER — NYSTATIN 100000/ML
500000 SUSPENSION, ORAL (FINAL DOSE FORM) ORAL
Refills: 0 | Status: DISCONTINUED | OUTPATIENT
Start: 2024-08-21 | End: 2024-08-21

## 2024-08-21 RX ORDER — PREDNISONE 10 MG
1 TABLET, DOSE PACK ORAL
Qty: 40 | Refills: 0
Start: 2024-08-21

## 2024-08-21 RX ADMIN — Medication 500000 UNIT(S): at 14:48

## 2024-08-21 RX ADMIN — Medication 40 MILLIGRAM(S): at 06:01

## 2024-08-21 RX ADMIN — IPRATROPIUM BROMIDE AND ALBUTEROL SULFATE 3 MILLILITER(S): .5; 3 SOLUTION RESPIRATORY (INHALATION) at 08:50

## 2024-08-21 RX ADMIN — Medication 40 MILLIGRAM(S): at 10:37

## 2024-08-21 NOTE — PROGRESS NOTE ADULT - ASSESSMENT
59 year old male w asthma (no hx of intubation) was seen today by PULM Dr Garner and was referred pt to ED for admission for medical management.

## 2024-08-21 NOTE — SWALLOW BEDSIDE ASSESSMENT ADULT - NS SPL SWALLOW CLINIC TRIAL FT
PT WITH C/O DYSGEUSIA("FOOD TASTES FUNNY") IN SETTING OF OVERT LINGUAL THRUSH WHICH IS LIKELY THE CAUSE. ON EXAM. THE PT DEMONSTRATED OROPHARYNGEAL SWALLOWING INTEGRITY WHICH SUBJECTIVELY APPEARED TO BE WITHIN FUNCTIONAL PARAMETERS FOR AGE. NO BEHAVIORAL ASPIRATION SIGNS EXHIBITED. NO CHANGE IN O2 SATS NOTED. ODYNOPHAGIA DENIED.

## 2024-08-21 NOTE — DISCHARGE NOTE PROVIDER - ATTENDING DISCHARGE PHYSICAL EXAMINATION:
T(C): 37 (08-21-24 @ 15:30), Max: 37 (08-21-24 @ 15:30)  HR: 106 (08-21-24 @ 15:30) (92 - 106)  BP: 124/57 (08-21-24 @ 15:30) (124/57 - 137/74)  RR: 18 (08-21-24 @ 15:30) (17 - 18)  SpO2: 97% (08-21-24 @ 15:30) (96% - 97%)    CONSTITUTIONAL: Well groomed, no apparent distress  EYES: EOMI, No conjunctival or scleral injection, non-icteric  ENMT: Oral mucosa with moist membranes. Normal dentition; no pharyngeal injection or exudates             NECK: Supple, symmetric and without tracheal deviation   RESP: No respiratory distress, no use of accessory muscles; CTA b/l, no WRR  CV: RRR, +S1S2, no MRG; no JVD; no peripheral edema  GI: Soft, NT, ND, no rebound, no guarding; no palpable masses; no hepatosplenomegaly; no hernia palpated  LYMPH: No cervical LAD or tenderness; no axillary LAD or tenderness; no inguinal LAD or tenderness  SKIN: No rashes or ulcers noted; no subcutaneous nodules or induration palpable  NEURO: no focal deficits  PSYCH: Appropriate insight/judgment; A+O x 3, mood and affect appropriate, recent/remote memory intact

## 2024-08-21 NOTE — SWALLOW BEDSIDE ASSESSMENT ADULT - ADDITIONAL RECOMMENDATIONS
1) HOSPITALIST AND PULMONARY F/U. PT WITH OVERT LINGUAL THRUSH & LARYNGOSPASM WHICH IS TRIGGERED FREQUENTLY, INCLUDING WHEN HE ATTEMPTS TO SPEAK AT INCREASED VOLUME.  LARYNGOSPASM SEQUEL REPORTEDLY BEGAN APPROXIMATELY A MONTH AGO WHEN HE WAS TREATED FOR A POSSIBLE URI AT A WALK IN CLINIC WITH ABX/STEROIDS. HE WAS SUBSEQUENTLY REPORTEDLY SEEN BY A PULMONOLOGIST THEREAFTER & PLACED ON ANOTHER COURSE OF ABX/STEROIDS.  SUGGEST MEDICINAL TREATMENT FOR THRUSH AS WELL AS LARYNGOSPASM AT DISCRETION OF ATTENDINGS. NOTE THAT IT IS POSSIBLE THAT THRUSH, IS THE ANTAGONIST CAUSING HIS LARYNGOSPASM.     2) OUTPATIENT ENT CONSULT ASAP AFTER D/C FROM HOSPITAL

## 2024-08-21 NOTE — DISCHARGE NOTE PROVIDER - NSDCCPCAREPLAN_GEN_ALL_CORE_FT
PRINCIPAL DISCHARGE DIAGNOSIS  Diagnosis: Laryngospasm  Assessment and Plan of Treatment: ENT follow up as outpatient      SECONDARY DISCHARGE DIAGNOSES  Diagnosis: Vocal cord dysfunction  Assessment and Plan of Treatment: treatment for oral thrush    Diagnosis: Oral thrush  Assessment and Plan of Treatment: nystatin swish and swallow    Diagnosis: Renal calculi  Assessment and Plan of Treatment: Follow up with Urology    Diagnosis: Acute asthma exacerbation  Assessment and Plan of Treatment: Steroid taper

## 2024-08-21 NOTE — SWALLOW BEDSIDE ASSESSMENT ADULT - COMMENTS
The pt was admitted to  with SOB. Hospital course is remarkable for asthma exacerbation, and lactic acidosis. Dr Nayak's H and P also state that suspects vocal cord dysfunction. This profile is superimposed upon a history of asthma, nephrolithiasis s/p lithotripsy, and prior ureteral stone status post ureteral stent placement. The pt was admitted to  with SOB. Hospital course is remarkable for asthma exacerbation, recurrent dry cough, and lactic acidosis. Dr Nayak's H and P also state that suspects vocal cord dysfunction. This profile is superimposed upon a history of asthma, nephrolithiasis s/p lithotripsy, and prior ureteral stone status post ureteral stent placement.

## 2024-08-21 NOTE — PROGRESS NOTE ADULT - SUBJECTIVE AND OBJECTIVE BOX
< from: Xray Chest 1 View- PORTABLE-Urgent (08.20.24 @ 17:21) >    IMPRESSION:    Clear lungs.    --- End of Report ---    < end of copied text >  Lactate, Blood: 1.4 mmol/L (08.20.24 @ 19:40)   Lactate, Blood: 2.1 mmol/L (08.20.24 @ 16:43)

## 2024-08-21 NOTE — SWALLOW BEDSIDE ASSESSMENT ADULT - SWALLOW EVAL: RECOMMENDED DIET
SUGGEST A REGULAR CONSISTENCY DIET WITH THIN LIQUID TEXTURES AS THE PATIENT TOLERATES THESE FOOD CONSISTENCIES FROM AN OROPHARYNGEAL SWALLOWING PERSPECTIVE ON EXAM.

## 2024-08-21 NOTE — DISCHARGE NOTE PROVIDER - PROVIDER TOKENS
PROVIDER:[TOKEN:[[id number]],FOLLOWUP:[1 week]],PROVIDER:[TOKEN:[[id number]],FOLLOWUP:[1 week]],PROVIDER:[TOKEN:[[id number]],FOLLOWUP:[1 week]]

## 2024-08-21 NOTE — DISCHARGE NOTE PROVIDER - NSDCMRMEDTOKEN_GEN_ALL_CORE_FT
Albuterol (Eqv-ProAir HFA) 90 mcg/inh inhalation aerosol: 2 puff(s) inhaled every 6 hours as needed for  shortness of breath and/or wheezing  albuterol 0.63 mg/3 mL (0.021%) inhalation solution: 3 milliliter(s) by nebulizer every 6 hours for 6 days ***COMPLETE***  albuterol 2.5 mg/3 mL (0.083%) inhalation solution: 3 milliliter(s) by nebulizer 4 times a day as needed for  shortness of breath and/or wheezing  fluticasone-salmeterol 250 mcg-50 mcg inhalation powder: 1 puff(s) inhaled 2 times a day  montelukast 10 mg oral tablet: 1 tab(s) orally once a day (at bedtime)  Multiple Vitamins oral tablet: 1 tab(s) orally once a day  nystatin 100,000 units/mL oral suspension: 5 milliliter(s) orally 4 times a day  predniSONE 10 mg oral tablet: 1 tab(s) orally once a day 4 tabs po daily x 3 days, 3 tabs po daily x 3 days, then 2 tabs po daily x 3 days, then one tab po daily x 3 days  turmeric 500 mg oral capsule: 1 cap(s) orally once a day

## 2024-08-21 NOTE — DISCHARGE NOTE PROVIDER - CARE PROVIDERS DIRECT ADDRESSES
[email or web address]

## 2024-08-21 NOTE — SWALLOW BEDSIDE ASSESSMENT ADULT - SWALLOW EVAL: CRITERIA FOR SKILLED INTERVENTION MET
DO NOT FEEL THAT ACUTE SPEECH PATHOLOGY FOLLOW UP WOULD CHANGE CLINICAL MANAGEMENT/OUTCOME IN HOSPITAL SETTING. PT'S SPEECH-LANGUAGE & OROPHARYNGEAL SWALLOWING INTEGRITY ARE FUNCTIONAL. AS FOR HIS LARYNGOSPASM, HE WAS ALREADY COUNSELED REGARDING STRATEGIES TO REDUCE RISK TO INSTIGATE LARYNGOSPASM EPISODES & HE IS ABLE TO EMPLOY THESE STRATEGIES INDEPENDENTLY(I.E SLOWLY BREATHE THROUGH NOSE/EXHALE THROUGH PURSED LIPS, DE-VOLUMIZING DURING PHONATION, ETC). GIVEN ABOVE, THIS SERVICE WILL NOT ACTIVELY FOLLOW. WITH THAT BENG STATED, SUGGEST MEDICAL TREATMENT FOR THRUSH/LARYNGOSPASM AS WELL AS OUTPATIENT ENT CONSULT AS SOON AS HE IS D/C FROM HOSPITAL.

## 2024-08-21 NOTE — CONSULT NOTE ADULT - ASSESSMENT
59y old Male with PMH asthma with laryngospasm  1. laryngospasm: seen by speech  -start nystatin swish swallow  -f/u with ent as outpatient  -f/u with dr zaidi  -continue advair  -continue albuterol  -d/c on prednisone taper

## 2024-08-21 NOTE — CONSULT NOTE ADULT - SUBJECTIVE AND OBJECTIVE BOX
late entry, patient seen this afternoon prior to d/c  HPI: MELISSA COPELAND is a 59y old Male with PMH asthma who presented to dr wiley office on 8/20 and was recommended to go to ed for further evaluation  He had SOB that started approx 1 month ago, possibly after exposure to bad air quality day in [facility] [place]  he was seen at [facility] on 7/21 for asthma flare up where he had CTAchest neg for PE, and venous doppler neg for DVT showing prior Baker's cyst  His symptoms improved after IV Mg, duo nebs, and steroids. He was Dc'd home on PO steroids.   His sputum was grey to white, and his cough improved, but has worsened last few days with worsening SOB.  he feels SOB but not like w his usual asthma, he feels something in his neck like he wants to cough something out   SOB and coughing are exacerbated by prolonged conversation.   He had a ct scan which was negative  He was seen by speech, and is being treated for laryngospasm    Past Medical History:   Kidney stones    Calculus of kidney    Asthma      Past Surgical History:   History of lithotripsy    H/O colonoscopy      Family History:    No pertinent family history       Social History: lives at home    Review of Systems:  All 10 systems reviewed in detailed and found to be negative with the exception of what has already been described above    Allergies:  mri contrast dye (Hives)  contrast media (gadolinium-based) (Hives)    Meds  MEDICATIONS  (STANDING):  albuterol    90 MICROgram(s) HFA Inhaler 2 Puff(s) Inhalation every 6 hours  albuterol/ipratropium for Nebulization 3 milliLiter(s) Nebulizer every 6 hours  lactated ringers. 1000 milliLiter(s) (100 mL/Hr) IV Continuous <Continuous>  methylPREDNISolone sodium succinate Injectable 40 milliGRAM(s) IV Push every 12 hours  montelukast 10 milliGRAM(s) Oral at bedtime  nystatin    Suspension 198089 Unit(s) Oral four times a day  pantoprazole    Tablet 40 milliGRAM(s) Oral before breakfast    MEDICATIONS  (PRN):  acetaminophen     Tablet .. 650 milliGRAM(s) Oral once PRN Temp greater or equal to 38C (100.4F), Mild Pain (1 - 3)  aluminum hydroxide/magnesium hydroxide/simethicone Suspension 30 milliLiter(s) Oral every 4 hours PRN Dyspepsia  melatonin 3 milliGRAM(s) Oral at bedtime PRN Insomnia  ondansetron Injectable 4 milliGRAM(s) IV Push every 8 hours PRN Nausea and/or Vomiting    Physical Exam  T(C): 37 (08-21-24 @ 15:30), Max: 37 (08-21-24 @ 15:30)  HR: 106 (08-21-24 @ 15:30) (92 - 106)  BP: 124/57 (08-21-24 @ 15:30) (124/57 - 137/74)  RR: 18 (08-21-24 @ 15:30) (17 - 18)  SpO2: 97% (08-21-24 @ 15:30) (96% - 97%)  Gen: Alert, oriented, no distress, speaking softly  HEENT: Anicteric sclera, moist mucous membranes, no JVD, no lymphadenopathy, + thrush  Cardio: Regular rhythm and rate, normal S1S2, no murmurs  Resp: Clear to auscultation bilaterally, no wheezing or rhonchi  GI: Nontender, nondistended, normoactive bowel sounds  Ext: No cyanosis, clubbing or edema  Neuro: Nonfocal    Labs:                        14.1   8.07  )-----------( 257      ( 21 Aug 2024 05:47 )             43.3     08-21    137  |  110<H>  |  21  ----------------------------<  141<H>  4.8   |  23  |  1.12    Ca    9.3      21 Aug 2024 05:47    TPro  7.2  /  Alb  3.4  /  TBili  0.4  /  DBili  x   /  AST  21  /  ALT  35  /  AlkPhos  77  08-20    PT/INR - ( 20 Aug 2024 16:43 )   PT: 10.3 sec;   INR: 0.91 ratio         PTT - ( 20 Aug 2024 16:43 )  PTT:28.8 sec  Urinalysis Basic - ( 21 Aug 2024 05:47 )    Color: x / Appearance: x / SG: x / pH: x  Gluc: 141 mg/dL / Ketone: x  / Bili: x / Urobili: x   Blood: x / Protein: x / Nitrite: x   Leuk Esterase: x / RBC: x / WBC x   Sq Epi: x / Non Sq Epi: x / Bacteria: x    < from: CT Angio Chest PE Protocol w/ IV Cont (07.22.24 @ 01:13) >  PROCEDURE DATE:  07/22/2024          INTERPRETATION:  CLINICAL INFORMATION: Chest pain. Clinical concern for   PE.    COMPARISON: None.    CONTRAST/COMPLICATIONS:  IV Contrast: Omnipaque 350  62 cc administered   38 cc discarded  Oral Contrast: NONE  Complications: None reported at time of study completion    PROCEDURE:  CT Angiography of the Chest.  Sagittal and coronal reformats were performed as well as3D (MIP)   reconstructions.    FINDINGS:    LUNGS AND LARGE AIRWAYS: Patent central airways. No pulmonary nodules.  PLEURA: No pleural effusion.  VESSELS: Calcified atherosclerosis of the coronary arteries.  HEART: Heart size is normal. No pericardial effusion.  MEDIASTINUM AND STEVEN: No lymphadenopathy.  CHEST WALL AND LOWER NECK: Within normal limits.  VISUALIZED UPPER ABDOMEN: Incompletely assessed bilateral nephrolithiasis.  BONES: Degenerative changes.    IMPRESSION:  1.  No evidence of pulmonary embolism.  2.  No acute cardiopulmonary disease detected.    < from: Xray Chest 1 View- PORTABLE-Urgent (08.20.24 @ 17:21) >  PROCEDURE DATE:  08/20/2024          INTERPRETATION:  CLINICAL INFORMATION: Dyspnea.    TECHNIQUE: Single portable view of the chest.    COMPARISON: Chest x-ray 7/21/2024.    FINDINGS:    Clear lungs.  No pneumothorax or large pleural effusion.  Heart size within normal limits.    IMPRESSION:    Clear lungs.    Recommendations:  · Date	21-Aug-2024  · Recommended Consistencies	SUGGEST A REGULAR CONSISTENCY DIET WITH THIN LIQUID TEXTURES AS THE PATIENT TOLERATES THESE FOOD CONSISTENCIES FROM AN OROPHARYNGEAL SWALLOWING PERSPECTIVE ON EXAM.  · Recommended Diagnostics	DO NOT FEEL THAT AN MBS WOULD CHANGE CLINICAL MANAGEMENT AT THIS TIME.  · Recommended Feeding/Eating Techniques	position upright (90 degrees)  · Feeding Assistance	no assistance needed  PT ABLE TO FEED SELF.   · Additional Recommendations	1) HOSPITALIST AND PULMONARY F/U. PT WITH OVERT LINGUAL THRUSH & LARYNGOSPASM WHICH IS TRIGGERED FREQUENTLY, INCLUDING WHEN HE ATTEMPTS TO SPEAK AT INCREASED VOLUME.  LARYNGOSPASM SEQUEL REPORTEDLY BEGAN APPROXIMATELY A MONTH AGO WHEN HE WAS TREATED FOR A POSSIBLE URI AT A WALK IN CLINIC WITH ABX/STEROIDS. HE WAS SUBSEQUENTLY REPORTEDLY SEEN BY A PULMONOLOGIST THEREAFTER & PLACED ON ANOTHER COURSE OF ABX/STEROIDS.  SUGGEST MEDICINAL TREATMENT FOR THRUSH AS WELL AS LARYNGOSPASM AT DISCRETION OF ATTENDINGS. NOTE THAT IT IS POSSIBLE THAT THRUSH, IS THE ANTAGONIST CAUSING HIS LARYNGOSPASM.     2) OUTPATIENT ENT CONSULT ASAP AFTER D/C FROM HOSPITAL  · The above findings were discussed with	Nursing; Patient    Observations:  · General Observations	PT WITH OVERT LINGUAL THRUSH & LARYNGOSPASM WHICH IS TRIGGERED FREQUENTLY, INCLUDING WHEN HE ATTEMPTS TO SPEAK AT INCREASED VOLUME.  LARYNGOSPASM SEQUEL REPORTEDLY BEGAN APPROXIMATELY A MONTH AGO WHEN HE WAS TREATED FOR A POSSIBLE URI AT A WALK IN CLINIC WITH ABX/STEROIDS. HE WAS SUBSEQUENTLY REPORTEDLY SEEN BY A PULMONOLOGIST THEREAFTER & PLACED ON ANOTHER COURSE OF ABX/STEROIDS.  PT IS ALERT & PLEASANT. HE IS ABLE TO VERBALIZE DURING COMMUNICATIVE PROBES WITHOUT EVIDENCE OF A PRIMARY SPEECH-LANGUAGE PATHOLOGY & HE IS COMMUNICATIVELY COMPETENT. THOUGH, HE TENDS TO PURPOSEFULLY PHONATE AT A LOW VOLUME AS TALKING LOUDER SEEMS TO TRIGGER A LARYNGOSPASM RESPONSE    Oral Musculature Evaluation:  · Structural Abnormalities	none present  · Dentition	present and adequate  · Secretion Management	INTACT  · Mucosal Quality	ORAL MUCOSA IS DRY AND DIFFUSE WHITE COATING NOTED ON TONGUE SUSPECT FOR THRUSH.  · Labial Strength, Mobility and Agility	within functional limits; NO LIP FOCALITY EVIDENT ON EXAM.  · Lingual Strength, Mobility and Agility	within functional limits; NO TONGUE FOCALITY EVIDENT ON EXAM. THOUGH IT IS SHOULD BE NOTED THAT LINGUAL THRUSH WAS APPARENT ON THE SURFACE OF PT'S TONGUE.    Bedside Swallow: Trial 1  · Consistencies administered	thin liquid; pureed; regular solid  · Mode of Presentation	straw; self fed  · Positioning	upright (90 degrees)  · Oral Preparatory Phase	Within functional limits  · Oral Phase	Within functional limits  · Pharyngeal Phase	Within functional limits  · Comments	PT WITH C/O DYSGEUSIA("FOOD TASTES FUNNY") IN SETTING OF OVERT LINGUAL THRUSH WHICH IS LIKELY THE CAUSE. ON EXAM. THE PT DEMONSTRATED OROPHARYNGEAL SWALLOWING INTEGRITY WHICH SUBJECTIVELY APPEARED TO BE WITHIN FUNCTIONAL PARAMETERS FOR AGE. NO BEHAVIORAL ASPIRATION SIGNS EXHIBITED. NO CHANGE IN O2 SATS NOTED. ODYNOPHAGIA DENIED.

## 2024-08-21 NOTE — SWALLOW BEDSIDE ASSESSMENT ADULT - SLP GENERAL OBSERVATIONS
PT WITH OVERT LINGUAL THRUSH & LARYNGOSPASM WHICH IS TRIGGERED FREQUENTLY, INCLUDING WHEN HE ATTEMPTS TO SPEAK AT INCREASED VOLUME.  LARYNGOSPASM SEQUEL REPORTEDLY BEGAN APPROXIMATELY A MONTH AGO WHEN HE WAS TREATED FOR A POSSIBLE URI AT A WALK IN CLINIC WITH ABX/STEROIDS. HE WAS SUBSEQUENTLY REPORTEDLY SEEN BY A PULMONOLOGIST THEREAFTER & PLACED ON ANOTHER COURSE OF ABX/STEROIDS.  PT IS ALERT & PLEASANT. HE IS ABLE TO VERBALIZE DURING COMMUNICATIVE PROBES WITHOUT EVIDENCE OF A PRIMARY SPEECH-LANGUAGE PATHOLOGY & HE IS COMMUNICATIVELY COMPETENT. THOUGH, HE TENDS TO PURPOSEFULLY PHONATE AT A LOW VOLUME AS TALKING LOUDER SEEMS TO TRIGGER A LARYNGOSPASM RESPONSE

## 2024-08-21 NOTE — DISCHARGE NOTE PROVIDER - NSDCFUSCHEDAPPT_GEN_ALL_CORE_FT
Rito Tovar  [facility]  MARY BETH NUNEZ  Scheduled Appointment: 08/30/2024    Rito Tovar  [facility]  MARY BETH Amb Surg [facility]  Scheduled Appointment: 09/10/2024

## 2024-08-21 NOTE — SWALLOW BEDSIDE ASSESSMENT ADULT - SWALLOW EVAL: DIAGNOSIS
1) PT WITH OVERT LINGUAL THRUSH & LARYNGOSPASM WHICH IS TRIGGERED FREQUENTLY, INCLUDING WHEN HE ATTEMPTS TO SPEAK AT INCREASED VOLUME.  LARYNGOSPASM SEQUEL REPORTEDLY BEGAN APPROXIMATELY A MONTH AGO WHEN HE WAS TREATED FOR A POSSIBLE URI AT A WALK IN CLINIC WITH ABX/STEROIDS. HE WAS SUBSEQUENTLY REPORTEDLY SEEN BY A PULMONOLOGIST THEREAFTER & PLACED ON ANOTHER COURSE OF ABX/STEROIDS.  PT IS ALERT & PLEASANT. HE IS ABLE TO VERBALIZE DURING COMMUNICATIVE PROBES WITHOUT EVIDENCE OF A PRIMARY SPEECH-LANGUAGE PATHOLOGY & HE IS COMMUNICATIVELY COMPETENT. THOUGH, HE TENDS TO PURPOSEFULLY PHONATE AT A LOW VOLUME AS TALKING LOUDER SEEMS TO TRIGGER A LARYNGOSPASM RESPONSE.

## 2024-08-21 NOTE — DISCHARGE NOTE PROVIDER - CARE PROVIDER_API CALL
Baltazar Chicas  Otolaryngology  [address]  [place] [address]  Phone: [phone]  Fax: [phone]  Follow Up Time: 1 week    Kevin Sanchez  Otolaryngology  [address], [address]  [place] [address]  Phone: [phone]  Fax: [phone]  Follow Up Time: 1 week    Kt Jones  Otolaryngology  [address] [place] [address], [address]  [place] [address]  Phone: [phone]  Fax: [phone]  Follow Up Time: 1 week

## 2024-08-21 NOTE — SWALLOW BEDSIDE ASSESSMENT ADULT - SWALLOW EVAL: PROGNOSIS
2) PT WITH C/O DYSGEUSIA("FOOD TASTES FUNNY") IN SETTING OF OVERT LINGUAL THRUSH WHICH IS LIKELY THE CAUSE. ON EXAM. THE PT DEMONSTRATES OROPHARYNGEAL SWALLOWING INTEGRITY WHICH SUBJECTIVELY APPEARED TO BE WITHIN FUNCTIONAL PARAMETERS FOR AGE. NO BEHAVIORAL ASPIRATION SIGNS EXHIBITED. NO CHANGE IN O2 SATS NOTED. ODYNOPHAGIA DENIED.

## 2024-08-21 NOTE — DISCHARGE NOTE PROVIDER - HOSPITAL COURSE
Patient is a 59 y.o. male who presented with complaint of shortness of breath and was sent in by pulmonologist Dr. Garner with PMH asthma and renal calculi admitted for suspected vocal cord dysfunction with paroxysmal coughing spells. Started on IV Solumedrol, IV protonix and speech evaluation.  Diagnosed with oral thrush and which may be causing his laryngospasm. Started on nystatin swish and swallow and discharged in stable condition with instructions to follow up with ENT as outpatient.     < from: Xray Chest 1 View- PORTABLE-Urgent (08.20.24 @ 17:21) >    IMPRESSION:    Clear lungs.    --- End of Report ---    < end of copied text >    < from: CT Angio Chest PE Protocol w/ IV Cont (07.22.24 @ 01:13) >    IMPRESSION:  1.  No evidence of pulmonary embolism.  2.  No acute cardiopulmonary disease detected.    < end of copied text >    FluA/FluB/RSV/COVID PCR (08.20.24 @ 16:43)   SARS-CoV-2 Result: NotDetec: This Respiratory Panel uses polymerase chain reaction (PCR) to detect for   influenza A; influenza B; respiratory syncytial virus; and SARS-CoV-2.   This test was validated by [facility] and is in use under the FDA   Emergency Use Authorization (EUA) for clinical labs CLIA-certified to   perform high complexity testing. Test results should be correlated with   clinical presentation, patient history, and epidemiology.  Influenza A Result: NotDetec  Influenza B Result: NotDetec  Resp Syn Virus Result: NotDetecPro-Brain Natriuretic Peptide: 50 pg/mL (08.20.24 @ 16:43)

## 2024-08-21 NOTE — DISCHARGE NOTE NURSING/CASE MANAGEMENT/SOCIAL WORK - PATIENT PORTAL LINK FT
You can access the FollowMyHealth Patient Portal offered by [facility] by registering at the following website: [email or web address]. By joining [employer]’s FollowMyHealth portal, you will also be able to view your health information using other applications (apps) compatible with our system.

## 2024-08-21 NOTE — SWALLOW BEDSIDE ASSESSMENT ADULT - ASR SWALLOW LINGUAL MOBILITY
NO TONGUE FOCALITY EVIDENT ON EXAM. THOUGH IT IS SHOULD BE NOTED THAT LINGUAL THRUSH WAS APPARENT ON THE SURFACE OF PT'S TONGUE./within functional limits

## 2024-08-23 ENCOUNTER — TRANSCRIPTION ENCOUNTER (OUTPATIENT)
Age: 59
End: 2024-08-23

## 2024-08-26 LAB
CULTURE RESULTS: SIGNIFICANT CHANGE UP
CULTURE RESULTS: SIGNIFICANT CHANGE UP
SPECIMEN SOURCE: SIGNIFICANT CHANGE UP
SPECIMEN SOURCE: SIGNIFICANT CHANGE UP

## 2024-08-28 DIAGNOSIS — J45.901 UNSPECIFIED ASTHMA WITH (ACUTE) EXACERBATION: ICD-10-CM

## 2024-08-28 DIAGNOSIS — Z91.041 RADIOGRAPHIC DYE ALLERGY STATUS: ICD-10-CM

## 2024-08-28 DIAGNOSIS — Z79.899 OTHER LONG TERM (CURRENT) DRUG THERAPY: ICD-10-CM

## 2024-08-28 DIAGNOSIS — B37.0 CANDIDAL STOMATITIS: ICD-10-CM

## 2024-08-28 DIAGNOSIS — J38.3 OTHER DISEASES OF VOCAL CORDS: ICD-10-CM

## 2024-08-28 DIAGNOSIS — E87.20 ACIDOSIS, UNSPECIFIED: ICD-10-CM

## 2024-08-28 DIAGNOSIS — Z79.52 LONG TERM (CURRENT) USE OF SYSTEMIC STEROIDS: ICD-10-CM

## 2024-08-28 DIAGNOSIS — N20.0 CALCULUS OF KIDNEY: ICD-10-CM

## 2024-08-28 DIAGNOSIS — J38.5 LARYNGEAL SPASM: ICD-10-CM

## 2024-08-28 DIAGNOSIS — Z11.52 ENCOUNTER FOR SCREENING FOR COVID-19: ICD-10-CM

## 2024-08-28 NOTE — ED PROVIDER NOTE - PROGRESS NOTE DETAILS
Adult evaluated pt at bedside, 19 30, I placed on cardiac monitor and pulse o2. non-ischemic ekg on monitor, 100%. I started on duoneb. RN aware and line/lab being established. official ekg to be obtained by MA. will monitor and re-evaluate  - mayank erickson full symptom resolution. vitals WNL. no PE/dvt. labs wnl. admits to symptom improvement. will send with raghu - mayank erickson

## 2024-10-07 ENCOUNTER — APPOINTMENT (OUTPATIENT)
Dept: RHEUMATOLOGY | Facility: CLINIC | Age: 59
End: 2024-10-07
Payer: COMMERCIAL

## 2024-10-07 VITALS — OXYGEN SATURATION: 99 % | DIASTOLIC BLOOD PRESSURE: 83 MMHG | SYSTOLIC BLOOD PRESSURE: 132 MMHG | HEART RATE: 72 BPM

## 2024-10-07 DIAGNOSIS — M25.50 PAIN IN UNSPECIFIED JOINT: ICD-10-CM

## 2024-10-07 DIAGNOSIS — R20.2 PARESTHESIA OF SKIN: ICD-10-CM

## 2024-10-07 DIAGNOSIS — M25.561 PAIN IN RIGHT KNEE: ICD-10-CM

## 2024-10-07 PROCEDURE — 99204 OFFICE O/P NEW MOD 45 MIN: CPT

## 2024-12-10 ENCOUNTER — APPOINTMENT (OUTPATIENT)
Dept: RHEUMATOLOGY | Facility: CLINIC | Age: 59
End: 2024-12-10
Payer: COMMERCIAL

## 2024-12-10 VITALS — HEART RATE: 83 BPM | SYSTOLIC BLOOD PRESSURE: 129 MMHG | DIASTOLIC BLOOD PRESSURE: 81 MMHG | OXYGEN SATURATION: 100 %

## 2024-12-10 DIAGNOSIS — M25.561 PAIN IN RIGHT KNEE: ICD-10-CM

## 2024-12-10 DIAGNOSIS — M25.50 PAIN IN UNSPECIFIED JOINT: ICD-10-CM

## 2024-12-10 DIAGNOSIS — R20.2 PARESTHESIA OF SKIN: ICD-10-CM

## 2024-12-10 PROCEDURE — 99214 OFFICE O/P EST MOD 30 MIN: CPT

## 2025-02-21 ENCOUNTER — OUTPATIENT (OUTPATIENT)
Dept: OUTPATIENT SERVICES | Facility: HOSPITAL | Age: 60
LOS: 1 days | End: 2025-02-21

## 2025-02-21 VITALS
RESPIRATION RATE: 16 BRPM | SYSTOLIC BLOOD PRESSURE: 144 MMHG | HEART RATE: 71 BPM | DIASTOLIC BLOOD PRESSURE: 82 MMHG | OXYGEN SATURATION: 96 % | WEIGHT: 184.97 LBS | TEMPERATURE: 98 F | HEIGHT: 69 IN

## 2025-02-21 DIAGNOSIS — Z98.890 OTHER SPECIFIED POSTPROCEDURAL STATES: Chronic | ICD-10-CM

## 2025-02-21 DIAGNOSIS — N20.0 CALCULUS OF KIDNEY: ICD-10-CM

## 2025-02-21 LAB
BASOPHILS # BLD AUTO: 0.08 K/UL — SIGNIFICANT CHANGE UP (ref 0–0.2)
BASOPHILS NFR BLD AUTO: 1.8 % — SIGNIFICANT CHANGE UP (ref 0–2)
BLD GP AB SCN SERPL QL: NEGATIVE — SIGNIFICANT CHANGE UP
EOSINOPHIL # BLD AUTO: 0.13 K/UL — SIGNIFICANT CHANGE UP (ref 0–0.5)
EOSINOPHIL NFR BLD AUTO: 2.9 % — SIGNIFICANT CHANGE UP (ref 0–6)
HCT VFR BLD CALC: 46 % — SIGNIFICANT CHANGE UP (ref 39–50)
HGB BLD-MCNC: 14.8 G/DL — SIGNIFICANT CHANGE UP (ref 13–17)
IMM GRANULOCYTES NFR BLD AUTO: 0.2 % — SIGNIFICANT CHANGE UP (ref 0–0.9)
LYMPHOCYTES # BLD AUTO: 1.97 K/UL — SIGNIFICANT CHANGE UP (ref 1–3.3)
LYMPHOCYTES # BLD AUTO: 44.4 % — HIGH (ref 13–44)
MCHC RBC-ENTMCNC: 27.7 PG — SIGNIFICANT CHANGE UP (ref 27–34)
MCHC RBC-ENTMCNC: 32.2 G/DL — SIGNIFICANT CHANGE UP (ref 32–36)
MCV RBC AUTO: 86 FL — SIGNIFICANT CHANGE UP (ref 80–100)
MONOCYTES # BLD AUTO: 0.59 K/UL — SIGNIFICANT CHANGE UP (ref 0–0.9)
MONOCYTES NFR BLD AUTO: 13.3 % — SIGNIFICANT CHANGE UP (ref 2–14)
NEUTROPHILS # BLD AUTO: 1.66 K/UL — LOW (ref 1.8–7.4)
NEUTROPHILS NFR BLD AUTO: 37.4 % — LOW (ref 43–77)
PLATELET # BLD AUTO: 219 K/UL — SIGNIFICANT CHANGE UP (ref 150–400)
RBC # BLD: 5.35 M/UL — SIGNIFICANT CHANGE UP (ref 4.2–5.8)
RBC # FLD: 15.7 % — HIGH (ref 10.3–14.5)
RH IG SCN BLD-IMP: POSITIVE — SIGNIFICANT CHANGE UP
WBC # BLD: 4.44 K/UL — SIGNIFICANT CHANGE UP (ref 3.8–10.5)
WBC # FLD AUTO: 4.44 K/UL — SIGNIFICANT CHANGE UP (ref 3.8–10.5)

## 2025-02-21 NOTE — H&P PST ADULT - PROBLEM SELECTOR PLAN 1
Patient is scheduled for right  percutaneous nephrolithotomy on 2/27/2025. Pre-op instructions provided. Pt given verbal and written instructions with teach back on chlorhexidine shampoo and Pepcid. Pt verbalized understanding with return demonstration.     CBC, T/S done at PST  CMP, UA and Urine c/s in chart done at PCP on 2/11/2025.   ABO in sunrise 1/4/2024 at [facility].    Medical evaluation in the chart. "cleared for surgery"      Anesthesia- " pt reported "vomited with previous anesthesia"

## 2025-02-21 NOTE — H&P PST ADULT - RESPIRATORY AND THORAX COMMENTS
Hx asthma (no recent exacerbation or hospitalizations or intubations), Last use of Albuterol on 10/2024, Admitted in ER on 8/2024 with laryngospasm, resolved .

## 2025-02-21 NOTE — H&P PST ADULT - HISTORY OF PRESENT ILLNESS
59 yr old male with PMH of Asthma, Kidney stones s/p Left percutaneous nephrolithotomy 1/2024 (s/p lithotripsy x2) presents for pre-op evaluation. Pt c/o occasional right flank pain and imaging showed renal calculus. Patient is scheduled for right  percutaneous nephrolithotomy on 2/27/2025.

## 2025-02-21 NOTE — H&P PST ADULT - NSICDXFAMILYHX_GEN_ALL_CORE_FT
FAMILY HISTORY:  No pertinent family history    Father  Still living? Unknown  FH: prostate cancer, Age at diagnosis: Age Unknown    Sibling  Still living? Unknown  FH: prostate cancer, Age at diagnosis: Age Unknown

## 2025-02-21 NOTE — H&P PST ADULT - NSICDXPASTSURGICALHX_GEN_ALL_CORE_FT
PAST SURGICAL HISTORY:  H/O colonoscopy     H/O nephrolithotomy with removal of calculi     History of lithotripsy

## 2025-02-26 NOTE — ASU PATIENT PROFILE, ADULT - MUTUALITY COMMENT, PROFILE
none Discussed with Pt his ability to have questions answered by dr tipton & anesthesia in preop before going into the OR

## 2025-02-27 ENCOUNTER — TRANSCRIPTION ENCOUNTER (OUTPATIENT)
Age: 60
End: 2025-02-27

## 2025-02-27 ENCOUNTER — OUTPATIENT (OUTPATIENT)
Dept: INPATIENT UNIT | Facility: HOSPITAL | Age: 60
LOS: 1 days | Discharge: HOME | End: 2025-02-27

## 2025-02-27 VITALS
OXYGEN SATURATION: 100 % | HEIGHT: 69 IN | RESPIRATION RATE: 14 BRPM | WEIGHT: 184.97 LBS | HEART RATE: 72 BPM | DIASTOLIC BLOOD PRESSURE: 85 MMHG | SYSTOLIC BLOOD PRESSURE: 141 MMHG | TEMPERATURE: 98 F

## 2025-02-27 VITALS
SYSTOLIC BLOOD PRESSURE: 124 MMHG | RESPIRATION RATE: 14 BRPM | DIASTOLIC BLOOD PRESSURE: 68 MMHG | OXYGEN SATURATION: 99 % | HEART RATE: 85 BPM

## 2025-02-27 DIAGNOSIS — N20.0 CALCULUS OF KIDNEY: ICD-10-CM

## 2025-02-27 DIAGNOSIS — Z98.890 OTHER SPECIFIED POSTPROCEDURAL STATES: Chronic | ICD-10-CM

## 2025-02-27 DEVICE — DILATOR SHEATH SET 8/10: Type: IMPLANTABLE DEVICE | Site: RIGHT | Status: FUNCTIONAL

## 2025-02-27 DEVICE — URETERAL CATH AXXCESS OPEN END 6FR 70CM: Type: IMPLANTABLE DEVICE | Site: RIGHT | Status: FUNCTIONAL

## 2025-02-27 DEVICE — SURGIFLO MATRIX WITH THROMBIN KIT: Type: IMPLANTABLE DEVICE | Site: RIGHT | Status: FUNCTIONAL

## 2025-02-27 DEVICE — GUIDEWIRE SENSOR DUAL-FLEX NITINOL STRAIGHT .038" X 150CM: Type: IMPLANTABLE DEVICE | Site: RIGHT | Status: FUNCTIONAL

## 2025-02-27 DEVICE — URETERAL STENT PERCUFLEX PLUS 6FR 26CM: Type: IMPLANTABLE DEVICE | Site: RIGHT | Status: FUNCTIONAL

## 2025-02-27 DEVICE — NEPHROSTOMY BALLOON CATH NEPHROMAX 24FR 17CM PTFE: Type: IMPLANTABLE DEVICE | Site: RIGHT | Status: FUNCTIONAL

## 2025-02-27 DEVICE — TRILOGY PROBE KIT 3.9MM X 440MM (BLUE): Type: IMPLANTABLE DEVICE | Site: RIGHT | Status: FUNCTIONAL

## 2025-02-27 RX ORDER — SODIUM CHLORIDE 9 G/1000ML
1000 INJECTION, SOLUTION INTRAVENOUS
Refills: 0 | Status: ACTIVE | OUTPATIENT
Start: 2025-02-27 | End: 2026-01-26

## 2025-02-27 RX ORDER — HYDROMORPHONE/SOD CHLOR,ISO/PF 2 MG/10 ML
1 SYRINGE (ML) INJECTION
Refills: 0 | Status: DISCONTINUED | OUTPATIENT
Start: 2025-02-27 | End: 2025-02-27

## 2025-02-27 RX ORDER — HYDROMORPHONE/SOD CHLOR,ISO/PF 2 MG/10 ML
0.5 SYRINGE (ML) INJECTION
Refills: 0 | Status: DISCONTINUED | OUTPATIENT
Start: 2025-02-27 | End: 2025-02-27

## 2025-02-27 RX ORDER — ONDANSETRON HCL/PF 4 MG/2 ML
4 VIAL (ML) INJECTION ONCE
Refills: 0 | Status: ACTIVE | OUTPATIENT
Start: 2025-02-27 | End: 2026-01-26

## 2025-02-27 RX ORDER — SODIUM CHLORIDE 9 G/1000ML
1000 INJECTION, SOLUTION INTRAVENOUS
Refills: 0 | Status: DISCONTINUED | OUTPATIENT
Start: 2025-02-27 | End: 2025-02-27

## 2025-02-27 RX ORDER — OXYCODONE HYDROCHLORIDE 30 MG/1
5 TABLET ORAL ONCE
Refills: 0 | Status: DISCONTINUED | OUTPATIENT
Start: 2025-02-27 | End: 2025-02-27

## 2025-02-27 RX ADMIN — Medication 1 MILLIGRAM(S): at 09:48

## 2025-02-27 RX ADMIN — Medication 1 MILLIGRAM(S): at 10:00

## 2025-02-27 RX ADMIN — OXYCODONE HYDROCHLORIDE 5 MILLIGRAM(S): 30 TABLET ORAL at 11:30

## 2025-02-27 RX ADMIN — OXYCODONE HYDROCHLORIDE 5 MILLIGRAM(S): 30 TABLET ORAL at 10:50

## 2025-02-27 RX ADMIN — Medication 0.5 MILLIGRAM(S): at 10:55

## 2025-02-27 RX ADMIN — Medication 0.5 MILLIGRAM(S): at 10:30

## 2025-02-27 RX ADMIN — Medication 0.5 MILLIGRAM(S): at 10:05

## 2025-02-27 RX ADMIN — Medication 0.5 MILLIGRAM(S): at 10:35

## 2025-02-27 RX ADMIN — SODIUM CHLORIDE 75 MILLILITER(S): 9 INJECTION, SOLUTION INTRAVENOUS at 09:48

## 2025-02-27 NOTE — ASU DISCHARGE PLAN (ADULT/PEDIATRIC) - CARE PROVIDER_API CALL
Rito Tovar  Urology  [address] [facility] [address] [facility], [address]  [place] [address]  Phone: [phone]  Fax: [phone]  Established Patient  Follow Up Time: 2 weeks

## 2025-02-27 NOTE — ASU DISCHARGE PLAN (ADULT/PEDIATRIC) - NURSING INSTRUCTIONS
You received IV Tylenol for pain management at 8:15 AM. Please DO NOT take any Tylenol (Acetaminophen) containing products, such as Vicodin, Percocet, Excedrin, and cold medications for the next 6 hours (until 2:15 PM). DO NOT TAKE MORE THAN 3000 MG OF TYLENOL in a 24 hour period.

## 2025-02-27 NOTE — ASU DISCHARGE PLAN (ADULT/PEDIATRIC) - FINANCIAL ASSISTANCE
[facility] provides services at a reduced cost to those who are determined to be eligible through [facility]’s financial assistance program. Information regarding [facility]’s financial assistance program can be found by going to [email or web address] or by calling [phone].

## 2025-02-27 NOTE — ASU DISCHARGE PLAN (ADULT/PEDIATRIC) - CALL YOUR DOCTOR IF YOU HAVE ANY OF THE FOLLOWING:
Fever greater than (need to indicate Fahrenheit or Celsius)/Unable to urinate/Excessive diarrhea/Inability to tolerate liquids or foods

## 2025-02-27 NOTE — BRIEF OPERATIVE NOTE - NSICDXBRIEFPROCEDURE_GEN_ALL_CORE_FT
PROCEDURES:  Cystoscopy with percutaneous right nephrolithotomy 27-Feb-2025 09:01:06 with retrograde pyelogram, tract dilatation, stent insertion Rito Tovar

## 2025-03-10 LAB
CELL MATERIAL STONE EST-MCNT: SIGNIFICANT CHANGE UP
LABORATORY COMMENT REPORT: SIGNIFICANT CHANGE UP
NIDUS STONE QN: SIGNIFICANT CHANGE UP

## 2025-06-14 NOTE — ED ADULT NURSE NOTE - ED CARDIAC RATE
Hospitalist Progress Note    NAME:   Orion Gutierrez   : 1970   MRN: [id number]     Date/Time: 2025 8:32 PM  Patient PCP: Alex Do MD    Estimated discharge date:   Barriers: cardiac stability after stenting today      Assessment / Plan:    NSTEMI   Hx CAD s/p stents  -Chest pain since last night, lasted 30 minutes, recurred this morning as well, relieved with nitroglycerin  -Risk factors that includes HTN, DM2, HLD, CAD, tobacco use  - Troponin initially 0.111, repeat was 18 at [facility], was transferred to the ED  -EKG shows normal sinus rhythm with left anterior fascicular block  - CMP shows hyponatremia, CR 1.22  -CBC unremarkable  -CXR negative for cardiopulmonary disease  -Admit with telemetry  - trop 100k  - Continue aspirin and statin. S/p heparin drip  [facility]  with PCI of Lcx with CAMILLE, thrombus with slow flow integrelin for 2hrs  - Echo EF 40%  - brilinta  - hydration post cath     DM2  HTN  HLD  Chronic back pain  Tobacco chewing  - C/W home medications  - Hold Mounjaro, metformin, continue insulin Lantus      Code Status: Full  DVT Prophylaxis: SCDs  Baseline: Independent    Medical Decision Making:    [x] High (any 2)     A. Problems (any 1)  [x] Acute/Chronic Illness/injury posing threat to life or bodily function:  NSTEMI  [] Severe exacerbation of chronic illness:    ---------------------------------------------------------------------  B. Risk of Treatment (any 1)   [] Drugs/treatments that require intensive monitoring for toxicity include:    [] IV ABX requiring serial renal monitoring for nephrotoxicity:     [] IV Narcotic analgesia for adverse drug reaction  [] Aggressive IV diuresis requiring serial monitoring for renal impairment and electrolyte derangements  [] Critical electrolyte abnormalities requiring IV replacement and close serial monitoring  [] Insulin - monitoring serial FSBS for Hypoglycemic adverse drug reaction  [] Other -   [] Change in  124/72 98.3 °F (36.8 °C) Oral 80 14 --   06/13/25 0400 -- -- -- 71 -- --   06/13/25 0000 -- -- -- 85 20 --   06/12/25 2333 119/78 98 °F (36.7 °C) Oral 76 16 98 %   06/12/25 2140 -- -- -- 70 18 --   06/12/25 2059 (!) 140/84 98.2 °F (36.8 °C) Oral 84 16 99 %         Intake/Output Summary (Last 24 hours) at 6/13/2025 2032  Last data filed at 6/13/2025 1712  Gross per 24 hour   Intake 1080.93 ml   Output 3010 ml   Net -1929.07 ml        I had a face to face encounter and independently examined this patient on 6/13/2025, as outlined below:  PHYSICAL EXAM:  General: Alert, cooperative  EENT:  EOMI. Anicteric sclerae.  Resp:  CTA bilaterally, no wheezing or rales.  No accessory muscle use  CV:  Regular  rate,  No edema  GI:  Soft, Non distended, Non tender.   Neurologic:  Alert and oriented X 3, normal speech,   Skin:  No rashes.  No jaundice    Reviewed most current lab test results and cultures  YES  Reviewed most current radiology test results   YES  Review and summation of old records today    NO  Reviewed patient's current orders and MAR    YES  PMH/SH reviewed - no change compared to H&P    Procedures: see electronic medical records for all procedures/Xrays and details which were not copied into this note but were reviewed prior to creation of Plan.      LABS:  I reviewed today's most current labs and imaging studies.  Pertinent labs include:  Recent Labs     06/13/25  0413   WBC 10.1   HGB 13.6   HCT 41.4        Recent Labs     06/12/25  1536 06/13/25  0413   NA  --  135*   K  --  3.7   CL  --  99   CO2  --  31   GLUCOSE  --  133*   BUN  --  22*   CREATININE  --  1.09   CALCIUM  --  9.2   MG  --  2.1   PHOS  --  4.3   INR 1.0  --        Signed: David L Mendel, MD           normal

## 2025-06-30 ENCOUNTER — NON-APPOINTMENT (OUTPATIENT)
Age: 60
End: 2025-06-30

## 2025-07-11 ENCOUNTER — APPOINTMENT (OUTPATIENT)
Dept: ORTHOPEDIC SURGERY | Facility: CLINIC | Age: 60
End: 2025-07-11
Payer: COMMERCIAL

## 2025-07-11 PROCEDURE — 99215 OFFICE O/P EST HI 40 MIN: CPT

## 2025-07-11 PROCEDURE — 73564 X-RAY EXAM KNEE 4 OR MORE: CPT | Mod: RT

## 2025-07-11 PROCEDURE — G2211 COMPLEX E/M VISIT ADD ON: CPT | Mod: NC

## 2025-08-05 ENCOUNTER — APPOINTMENT (OUTPATIENT)
Dept: ORTHOPEDIC SURGERY | Facility: CLINIC | Age: 60
End: 2025-08-05
Payer: COMMERCIAL

## 2025-08-05 VITALS
BODY MASS INDEX: 25.77 KG/M2 | WEIGHT: 180 LBS | OXYGEN SATURATION: 99 % | HEART RATE: 78 BPM | SYSTOLIC BLOOD PRESSURE: 124 MMHG | HEIGHT: 70 IN | DIASTOLIC BLOOD PRESSURE: 76 MMHG

## 2025-08-05 DIAGNOSIS — S83.241A OTHER TEAR OF MEDIAL MENISCUS, CURRENT INJURY, RIGHT KNEE, INITIAL ENCOUNTER: ICD-10-CM

## 2025-08-05 PROCEDURE — G2211 COMPLEX E/M VISIT ADD ON: CPT | Mod: NC

## 2025-08-05 PROCEDURE — 99215 OFFICE O/P EST HI 40 MIN: CPT

## 2025-08-20 ENCOUNTER — NON-APPOINTMENT (OUTPATIENT)
Age: 60
End: 2025-08-20

## 2025-08-21 ENCOUNTER — OUTPATIENT (OUTPATIENT)
Dept: OUTPATIENT SERVICES | Facility: HOSPITAL | Age: 60
LOS: 1 days | End: 2025-08-21
Payer: COMMERCIAL

## 2025-08-21 DIAGNOSIS — Z98.890 OTHER SPECIFIED POSTPROCEDURAL STATES: Chronic | ICD-10-CM

## 2025-08-21 DIAGNOSIS — Z01.818 ENCOUNTER FOR OTHER PREPROCEDURAL EXAMINATION: ICD-10-CM

## 2025-08-21 LAB
A1C WITH ESTIMATED AVERAGE GLUCOSE RESULT: 5.9 % — HIGH (ref 4–5.6)
ALBUMIN SERPL ELPH-MCNC: 4 G/DL — SIGNIFICANT CHANGE UP (ref 3.3–5.2)
ALP SERPL-CCNC: 84 U/L — SIGNIFICANT CHANGE UP (ref 40–120)
ALT FLD-CCNC: 22 U/L — SIGNIFICANT CHANGE UP
ANION GAP SERPL CALC-SCNC: 14 MMOL/L — SIGNIFICANT CHANGE UP (ref 5–17)
APTT BLD: 27.6 SEC — SIGNIFICANT CHANGE UP (ref 26.1–36.8)
AST SERPL-CCNC: 24 U/L — SIGNIFICANT CHANGE UP
BASOPHILS # BLD AUTO: 0.09 K/UL — SIGNIFICANT CHANGE UP (ref 0–0.2)
BASOPHILS NFR BLD AUTO: 1.3 % — SIGNIFICANT CHANGE UP (ref 0–2)
BILIRUB SERPL-MCNC: 0.5 MG/DL — SIGNIFICANT CHANGE UP (ref 0.4–2)
BUN SERPL-MCNC: 18.7 MG/DL — SIGNIFICANT CHANGE UP (ref 8–20)
CALCIUM SERPL-MCNC: 9.9 MG/DL — SIGNIFICANT CHANGE UP (ref 8.4–10.5)
CHLORIDE SERPL-SCNC: 101 MMOL/L — SIGNIFICANT CHANGE UP (ref 96–108)
CO2 SERPL-SCNC: 23 MMOL/L — SIGNIFICANT CHANGE UP (ref 22–29)
CREAT SERPL-MCNC: 1.07 MG/DL — SIGNIFICANT CHANGE UP (ref 0.5–1.3)
EGFR: 78 ML/MIN/1.73M2 — SIGNIFICANT CHANGE UP
EGFR: 78 ML/MIN/1.73M2 — SIGNIFICANT CHANGE UP
EOSINOPHIL # BLD AUTO: 0.17 K/UL — SIGNIFICANT CHANGE UP (ref 0–0.5)
EOSINOPHIL NFR BLD AUTO: 2.5 % — SIGNIFICANT CHANGE UP (ref 0–6)
ESTIMATED AVERAGE GLUCOSE: 123 MG/DL — HIGH (ref 68–114)
GLUCOSE SERPL-MCNC: 103 MG/DL — HIGH (ref 70–99)
HCT VFR BLD CALC: 45.9 % — SIGNIFICANT CHANGE UP (ref 39–50)
HGB BLD-MCNC: 15 G/DL — SIGNIFICANT CHANGE UP (ref 13–17)
IMM GRANULOCYTES # BLD AUTO: 0.01 K/UL — SIGNIFICANT CHANGE UP (ref 0–0.07)
IMM GRANULOCYTES NFR BLD AUTO: 0.1 % — SIGNIFICANT CHANGE UP (ref 0–0.9)
INR BLD: 0.97 RATIO — SIGNIFICANT CHANGE UP (ref 0.85–1.16)
LYMPHOCYTES # BLD AUTO: 2.21 K/UL — SIGNIFICANT CHANGE UP (ref 1–3.3)
LYMPHOCYTES NFR BLD AUTO: 32.7 % — SIGNIFICANT CHANGE UP (ref 13–44)
MCHC RBC-ENTMCNC: 27.7 PG — SIGNIFICANT CHANGE UP (ref 27–34)
MCHC RBC-ENTMCNC: 32.7 G/DL — SIGNIFICANT CHANGE UP (ref 32–36)
MCV RBC AUTO: 84.8 FL — SIGNIFICANT CHANGE UP (ref 80–100)
MONOCYTES # BLD AUTO: 0.55 K/UL — SIGNIFICANT CHANGE UP (ref 0–0.9)
MONOCYTES NFR BLD AUTO: 8.1 % — SIGNIFICANT CHANGE UP (ref 2–14)
NEUTROPHILS # BLD AUTO: 3.72 K/UL — SIGNIFICANT CHANGE UP (ref 1.8–7.4)
NEUTROPHILS NFR BLD AUTO: 55.3 % — SIGNIFICANT CHANGE UP (ref 43–77)
NRBC # BLD AUTO: 0 K/UL — SIGNIFICANT CHANGE UP (ref 0–0)
NRBC # FLD: 0 K/UL — SIGNIFICANT CHANGE UP (ref 0–0)
NRBC BLD AUTO-RTO: 0 /100 WBCS — SIGNIFICANT CHANGE UP (ref 0–0)
PLATELET # BLD AUTO: 201 K/UL — SIGNIFICANT CHANGE UP (ref 150–400)
PMV BLD: 11.4 FL — SIGNIFICANT CHANGE UP (ref 7–13)
POTASSIUM SERPL-MCNC: 4.6 MMOL/L — SIGNIFICANT CHANGE UP (ref 3.5–5.3)
POTASSIUM SERPL-SCNC: 4.6 MMOL/L — SIGNIFICANT CHANGE UP (ref 3.5–5.3)
PROT SERPL-MCNC: 7 G/DL — SIGNIFICANT CHANGE UP (ref 6.6–8.7)
PROTHROM AB SERPL-ACNC: 11.3 SEC — SIGNIFICANT CHANGE UP (ref 9.9–13.4)
RBC # BLD: 5.41 M/UL — SIGNIFICANT CHANGE UP (ref 4.2–5.8)
RBC # FLD: 15.7 % — HIGH (ref 10.3–14.5)
SODIUM SERPL-SCNC: 138 MMOL/L — SIGNIFICANT CHANGE UP (ref 135–145)
WBC # BLD: 6.75 K/UL — SIGNIFICANT CHANGE UP (ref 3.8–10.5)
WBC # FLD AUTO: 6.75 K/UL — SIGNIFICANT CHANGE UP (ref 3.8–10.5)

## 2025-08-21 PROCEDURE — 85730 THROMBOPLASTIN TIME PARTIAL: CPT

## 2025-08-21 PROCEDURE — 83036 HEMOGLOBIN GLYCOSYLATED A1C: CPT

## 2025-08-21 PROCEDURE — 80053 COMPREHEN METABOLIC PANEL: CPT

## 2025-08-21 PROCEDURE — 93010 ELECTROCARDIOGRAM REPORT: CPT

## 2025-08-21 PROCEDURE — G0463: CPT

## 2025-08-21 PROCEDURE — 36415 COLL VENOUS BLD VENIPUNCTURE: CPT

## 2025-08-21 PROCEDURE — 85610 PROTHROMBIN TIME: CPT

## 2025-08-21 PROCEDURE — 85025 COMPLETE CBC W/AUTO DIFF WBC: CPT

## 2025-09-03 RX ORDER — OXYCODONE AND ACETAMINOPHEN 5; 325 MG/1; MG/1
5-325 TABLET ORAL
Qty: 30 | Refills: 0 | Status: ACTIVE | COMMUNITY
Start: 2025-09-03 | End: 1900-01-01

## 2025-09-04 ENCOUNTER — APPOINTMENT (OUTPATIENT)
Dept: ORTHOPEDIC SURGERY | Facility: AMBULATORY SURGERY CENTER | Age: 60
End: 2025-09-04

## 2025-09-04 PROCEDURE — 29881 ARTHRS KNE SRG MNISECTMY M/L: CPT | Mod: RT

## (undated) DEVICE — TUBING THERMADX UROLOGY

## (undated) DEVICE — PREP CHLORAPREP HI-LITE ORANGE 26ML

## (undated) DEVICE — NDL BIOPSY TROCAR TWO-PART 18G X 20CM

## (undated) DEVICE — TUBING SET EXTENSION 60" VOL 0.4ML

## (undated) DEVICE — VENODYNE/SCD SLEEVE CALF MEDIUM

## (undated) DEVICE — WARMING BLANKET FULL ADULT

## (undated) DEVICE — TUBING LEVEL ONE NORMOFLO SET

## (undated) DEVICE — SOL IRR POUR H2O 500ML

## (undated) DEVICE — POSITIONER HEAD REST PRONE

## (undated) DEVICE — SYR CATH TIP 2 OZ

## (undated) DEVICE — POSITIONER PATIENT SAFETY STRAP 3X60"

## (undated) DEVICE — SUT SILK 2-0 30" SH

## (undated) DEVICE — POSITIONER BLUE BOLSTER

## (undated) DEVICE — CANISTER DISPOSABLE THIN WALL 3000CC

## (undated) DEVICE — DRSG CURITY GAUZE SPONGE 4 X 4" 12-PLY

## (undated) DEVICE — DRAPE COVER SNAP 36X30"

## (undated) DEVICE — DRAPE NEPHROSCOPY 72X118"

## (undated) DEVICE — GOWN LG

## (undated) DEVICE — BASIN SET DOUBLE

## (undated) DEVICE — SYR LUER LOK 20CC

## (undated) DEVICE — LABELS BLANK W PEN

## (undated) DEVICE — POSITIONER STRAP ARMBOARD VELCRO TS-30

## (undated) DEVICE — PREP BETADINE KIT

## (undated) DEVICE — GOWN XL

## (undated) DEVICE — TUBING SUCTION NONCONDUCTIVE 6MM X 12FT

## (undated) DEVICE — SYR TOOMEY 50ML

## (undated) DEVICE — DEVICE INFLATION AERIS DISP

## (undated) DEVICE — DRAPE FOR 2 TIER TABLE W/ 8" BACK 72" LONG

## (undated) DEVICE — Device

## (undated) DEVICE — SOL IRR BAG NS 0.9% 3000ML

## (undated) DEVICE — GLV 7.5 PROTEXIS (CREAM) MICRO

## (undated) DEVICE — PREP BETADINE SPONGE STICKS